# Patient Record
Sex: FEMALE | Race: ASIAN | NOT HISPANIC OR LATINO | Employment: FULL TIME | URBAN - METROPOLITAN AREA
[De-identification: names, ages, dates, MRNs, and addresses within clinical notes are randomized per-mention and may not be internally consistent; named-entity substitution may affect disease eponyms.]

---

## 2018-02-05 ENCOUNTER — OFFICE VISIT - HEALTHEAST (OUTPATIENT)
Dept: FAMILY MEDICINE | Facility: CLINIC | Age: 23
End: 2018-02-05

## 2018-02-05 ENCOUNTER — COMMUNICATION - HEALTHEAST (OUTPATIENT)
Dept: TELEHEALTH | Facility: CLINIC | Age: 23
End: 2018-02-05

## 2018-02-05 DIAGNOSIS — N63.10 BREAST MASS, RIGHT: ICD-10-CM

## 2018-02-05 ASSESSMENT — MIFFLIN-ST. JEOR: SCORE: 1249.26

## 2018-02-12 ENCOUNTER — HOSPITAL ENCOUNTER (OUTPATIENT)
Dept: MAMMOGRAPHY | Facility: HOSPITAL | Age: 23
Discharge: HOME | End: 2018-02-12
Attending: PHYSICIAN ASSISTANT

## 2018-02-12 DIAGNOSIS — N63.10 BREAST MASS, RIGHT: ICD-10-CM

## 2018-02-25 ENCOUNTER — HEALTH MAINTENANCE LETTER (OUTPATIENT)
Age: 23
End: 2018-02-25

## 2018-02-26 ENCOUNTER — OFFICE VISIT - HEALTHEAST (OUTPATIENT)
Dept: SURGERY | Facility: CLINIC | Age: 23
End: 2018-02-26

## 2018-02-26 DIAGNOSIS — N63.10 BREAST MASS, RIGHT: ICD-10-CM

## 2018-02-26 ASSESSMENT — MIFFLIN-ST. JEOR: SCORE: 1236

## 2018-03-16 ENCOUNTER — RECORDS - HEALTHEAST (OUTPATIENT)
Dept: LAB | Facility: CLINIC | Age: 23
End: 2018-03-16

## 2018-03-19 LAB
QTF INTERPRETATION: NORMAL
QTF MITOGEN - NIL: >10 IU/ML
QTF NIL: 0.05 IU/ML
QTF RESULT: NEGATIVE
QTF TB ANTIGEN - NIL: -0.02 IU/ML

## 2019-11-15 ENCOUNTER — COMMUNICATION - HEALTHEAST (OUTPATIENT)
Dept: TELEHEALTH | Facility: CLINIC | Age: 24
End: 2019-11-15

## 2019-11-15 ENCOUNTER — OFFICE VISIT - HEALTHEAST (OUTPATIENT)
Dept: INTERNAL MEDICINE | Facility: CLINIC | Age: 24
End: 2019-11-15

## 2019-11-15 DIAGNOSIS — Z71.1 CONCERN ABOUT STD IN FEMALE WITHOUT DIAGNOSIS: ICD-10-CM

## 2019-11-15 ASSESSMENT — MIFFLIN-ST. JEOR: SCORE: 1210.71

## 2020-02-24 ENCOUNTER — COMMUNICATION - HEALTHEAST (OUTPATIENT)
Dept: SCHEDULING | Facility: CLINIC | Age: 25
End: 2020-02-24

## 2020-09-11 ENCOUNTER — COMMUNICATION - HEALTHEAST (OUTPATIENT)
Dept: FAMILY MEDICINE | Facility: CLINIC | Age: 25
End: 2020-09-11

## 2020-09-23 ENCOUNTER — PRENATAL OFFICE VISIT - HEALTHEAST (OUTPATIENT)
Dept: FAMILY MEDICINE | Facility: CLINIC | Age: 25
End: 2020-09-23

## 2020-09-23 DIAGNOSIS — Z34.02 PRIMIGRAVIDA IN SECOND TRIMESTER: ICD-10-CM

## 2020-09-23 DIAGNOSIS — Z32.01 PREGNANCY TEST POSITIVE: ICD-10-CM

## 2020-09-23 DIAGNOSIS — N92.6 ABNORMAL MENSES: ICD-10-CM

## 2020-09-23 LAB
ALBUMIN UR-MCNC: NEGATIVE MG/DL
AMPHETAMINES UR QL SCN: NORMAL
BARBITURATES UR QL: NORMAL
BASOPHILS # BLD AUTO: 0 THOU/UL (ref 0–0.2)
BASOPHILS NFR BLD AUTO: 0 % (ref 0–2)
BENZODIAZ UR QL: NORMAL
CANNABINOIDS UR QL SCN: NORMAL
COCAINE UR QL: NORMAL
CREAT UR-MCNC: 149.6 MG/DL
EOSINOPHIL # BLD AUTO: 0.1 THOU/UL (ref 0–0.4)
EOSINOPHIL NFR BLD AUTO: 1 % (ref 0–6)
ERYTHROCYTE [DISTWIDTH] IN BLOOD BY AUTOMATED COUNT: 17.1 % (ref 11–14.5)
GLUCOSE UR STRIP-MCNC: NEGATIVE MG/DL
HCG UR QL: POSITIVE
HCT VFR BLD AUTO: 30.1 % (ref 35–47)
HGB BLD-MCNC: 9.3 G/DL (ref 12–16)
HIV 1+2 AB+HIV1 P24 AG SERPL QL IA: NEGATIVE
IMM GRANULOCYTES # BLD: 0.2 THOU/UL
IMM GRANULOCYTES NFR BLD: 1 %
KETONES UR STRIP-MCNC: NEGATIVE MG/DL
LYMPHOCYTES # BLD AUTO: 1.9 THOU/UL (ref 0.8–4.4)
LYMPHOCYTES NFR BLD AUTO: 15 % (ref 20–40)
MCH RBC QN AUTO: 20.4 PG (ref 27–34)
MCHC RBC AUTO-ENTMCNC: 30.9 G/DL (ref 32–36)
MCV RBC AUTO: 66 FL (ref 80–100)
MONOCYTES # BLD AUTO: 0.8 THOU/UL (ref 0–0.9)
MONOCYTES NFR BLD AUTO: 6 % (ref 2–10)
NEUTROPHILS # BLD AUTO: 9.7 THOU/UL (ref 2–7.7)
NEUTROPHILS NFR BLD AUTO: 77 % (ref 50–70)
OPIATES UR QL SCN: NORMAL
OXYCODONE UR QL: NORMAL
PCP UR QL SCN: NORMAL
PLATELET # BLD AUTO: 277 THOU/UL (ref 140–440)
PMV BLD AUTO: 11.3 FL (ref 8.5–12.5)
RBC # BLD AUTO: 4.56 MILL/UL (ref 3.8–5.4)
WBC: 12.6 THOU/UL (ref 4–11)

## 2020-09-23 ASSESSMENT — MIFFLIN-ST. JEOR: SCORE: 1241.89

## 2020-09-24 LAB
ABO/RH(D): NORMAL
ABORH REPEAT: NORMAL
ANTIBODY SCREEN: NEGATIVE
BACTERIA SPEC CULT: NORMAL
C TRACH DNA SPEC QL PROBE+SIG AMP: NEGATIVE
HBV SURFACE AG SERPL QL IA: NEGATIVE
N GONORRHOEA DNA SPEC QL NAA+PROBE: NEGATIVE
RUBV IGG SERPL QL IA: POSITIVE
T PALLIDUM AB SER QL: NEGATIVE

## 2020-09-26 LAB — LEAD BLDV-MCNC: <2 UG/DL

## 2020-09-27 LAB
COLLECTION METHOD: NORMAL
LEAD BLD-MCNC: NORMAL UG/DL

## 2020-10-02 ENCOUNTER — COMMUNICATION - HEALTHEAST (OUTPATIENT)
Dept: FAMILY MEDICINE | Facility: CLINIC | Age: 25
End: 2020-10-02

## 2020-10-02 ENCOUNTER — AMBULATORY - HEALTHEAST (OUTPATIENT)
Dept: FAMILY MEDICINE | Facility: CLINIC | Age: 25
End: 2020-10-02

## 2020-10-27 ENCOUNTER — PRENATAL OFFICE VISIT - HEALTHEAST (OUTPATIENT)
Dept: FAMILY MEDICINE | Facility: CLINIC | Age: 25
End: 2020-10-27

## 2020-10-27 DIAGNOSIS — Z12.4 CERVICAL CANCER SCREENING: ICD-10-CM

## 2020-10-27 DIAGNOSIS — Z34.00 SUPERVISION OF NORMAL FIRST PREGNANCY, ANTEPARTUM: ICD-10-CM

## 2020-10-27 DIAGNOSIS — D50.9 MICROCYTIC ANEMIA: ICD-10-CM

## 2020-10-27 DIAGNOSIS — B37.31 YEAST VAGINITIS: ICD-10-CM

## 2020-10-27 DIAGNOSIS — N63.0 LUMP OR MASS IN BREAST: ICD-10-CM

## 2020-10-27 DIAGNOSIS — A63.0 GENITAL WARTS: ICD-10-CM

## 2020-10-27 DIAGNOSIS — Z34.02 SUPERVISION OF NORMAL FIRST PREGNANCY IN SECOND TRIMESTER: ICD-10-CM

## 2020-10-27 LAB
ALBUMIN UR-MCNC: NEGATIVE MG/DL
ERYTHROCYTE [DISTWIDTH] IN BLOOD BY AUTOMATED COUNT: 15.6 % (ref 11–14.5)
GLUCOSE UR STRIP-MCNC: NEGATIVE MG/DL
HCT VFR BLD AUTO: 26.2 % (ref 35–47)
HGB BLD-MCNC: 8.2 G/DL (ref 12–16)
KETONES UR STRIP-MCNC: NEGATIVE MG/DL
MCH RBC QN AUTO: 20.8 PG (ref 27–34)
MCHC RBC AUTO-ENTMCNC: 31.3 G/DL (ref 32–36)
MCV RBC AUTO: 67 FL (ref 80–100)
PLATELET # BLD AUTO: 262 THOU/UL (ref 140–440)
PMV BLD AUTO: 8.5 FL (ref 7–10)
RBC # BLD AUTO: 3.94 MILL/UL (ref 3.8–5.4)
WBC: 13.9 THOU/UL (ref 4–11)

## 2020-10-27 ASSESSMENT — MIFFLIN-ST. JEOR: SCORE: 1266.84

## 2020-10-28 LAB
IRON SATN MFR SERPL: 38 % (ref 20–50)
IRON SERPL-MCNC: 96 UG/DL (ref 42–175)
TIBC SERPL-MCNC: 252 UG/DL (ref 313–563)
TRANSFERRIN SERPL-MCNC: 202 MG/DL (ref 212–360)

## 2020-10-30 LAB
# FETUSES US: NORMAL
AFP MOM SERPL: 1.01
AFP SERPL-MCNC: 69 NG/ML
AGE - REPORTED: 24.2 YR
BKR LAB AP ABNORMAL BLEEDING: NO
BKR LAB AP BIRTH CONTROL/HORMONES: NORMAL
BKR LAB AP CERVICAL APPEARANCE: NORMAL
BKR LAB AP GYN ADEQUACY: NORMAL
BKR LAB AP GYN INTERPRETATION: NORMAL
BKR LAB AP GYN OTHER FINDINGS: NORMAL
BKR LAB AP HPV REFLEX: NORMAL
BKR LAB AP LMP: NORMAL
BKR LAB AP PATIENT STATUS: NORMAL
BKR LAB AP PREVIOUS ABNORMAL: NORMAL
BKR LAB AP PREVIOUS NORMAL: NORMAL
CURRENT SMOKER: NO
FAMILY MEMBER DISEASES HX: NO
GA METHOD: NORMAL
GA: NORMAL WK
HCG MOM SERPL: 0.98
HCG SERPL-ACNC: NORMAL IU/L
HIGH RISK?: NO
HX OF HEREDITARY DISORDERS: NO
IDDM PATIENT QL: NO
INHIBIN A MOM SERPL: 1.16
INHIBIN A SERPL-MCNC: 218 PG/ML
INTEGRATED SCN PATIENT-IMP: NORMAL
PATH REPORT.COMMENTS IMP SPEC: NORMAL
PATHOLOGY STUDY: NORMAL
RESULT FLAG (HE HISTORICAL CONVERSION): NORMAL
SPECIMEN DRAWN SERPL: NORMAL
U ESTRIOL MOM SERPL: 0.88
U ESTRIOL SERPL-MCNC: 2.36 NG/ML

## 2020-11-02 ENCOUNTER — COMMUNICATION - HEALTHEAST (OUTPATIENT)
Dept: FAMILY MEDICINE | Facility: CLINIC | Age: 25
End: 2020-11-02

## 2020-11-02 LAB
HEMOGLOBIN A2 QUANTITATION: 6 % (ref 2.2–3.5)
HEMOGLOBIN ELECTROPHRESIS: ABNORMAL
HEMOGLOBIN F QUANTITATION: 2.2 % (ref 0–2)
PATH ICD:: ABNORMAL
REVIEWING PATHOLOGIST: ABNORMAL

## 2020-11-11 ENCOUNTER — COMMUNICATION - HEALTHEAST (OUTPATIENT)
Dept: FAMILY MEDICINE | Facility: CLINIC | Age: 25
End: 2020-11-11

## 2020-11-12 ENCOUNTER — HOSPITAL ENCOUNTER (OUTPATIENT)
Dept: ULTRASOUND IMAGING | Facility: CLINIC | Age: 25
Discharge: HOME | End: 2020-11-12
Attending: FAMILY MEDICINE

## 2020-11-12 DIAGNOSIS — Z34.02 SUPERVISION OF NORMAL FIRST PREGNANCY IN SECOND TRIMESTER: ICD-10-CM

## 2020-11-12 DIAGNOSIS — N63.0 LUMP OR MASS IN BREAST: ICD-10-CM

## 2020-11-19 ENCOUNTER — COMMUNICATION - HEALTHEAST (OUTPATIENT)
Dept: FAMILY MEDICINE | Facility: CLINIC | Age: 25
End: 2020-11-19

## 2020-12-01 ENCOUNTER — PRENATAL OFFICE VISIT - HEALTHEAST (OUTPATIENT)
Dept: FAMILY MEDICINE | Facility: CLINIC | Age: 25
End: 2020-12-01

## 2020-12-01 DIAGNOSIS — D56.3 ALPHA THALASSEMIA TRAIT: ICD-10-CM

## 2020-12-01 DIAGNOSIS — O99.810 ABNORMAL GLUCOSE TOLERANCE TEST IN PREGNANCY: ICD-10-CM

## 2020-12-01 DIAGNOSIS — O99.019 IRON DEFICIENCY ANEMIA DURING PREGNANCY: ICD-10-CM

## 2020-12-01 DIAGNOSIS — Z34.00 SUPERVISION OF NORMAL FIRST PREGNANCY, ANTEPARTUM: ICD-10-CM

## 2020-12-01 DIAGNOSIS — D50.9 IRON DEFICIENCY ANEMIA DURING PREGNANCY: ICD-10-CM

## 2020-12-02 ENCOUNTER — AMBULATORY - HEALTHEAST (OUTPATIENT)
Dept: LAB | Facility: CLINIC | Age: 25
End: 2020-12-02

## 2020-12-02 DIAGNOSIS — O99.019 IRON DEFICIENCY ANEMIA DURING PREGNANCY: ICD-10-CM

## 2020-12-02 DIAGNOSIS — Z34.00 SUPERVISION OF NORMAL FIRST PREGNANCY, ANTEPARTUM: ICD-10-CM

## 2020-12-02 DIAGNOSIS — D50.9 IRON DEFICIENCY ANEMIA DURING PREGNANCY: ICD-10-CM

## 2020-12-02 DIAGNOSIS — D56.3 ALPHA THALASSEMIA TRAIT: ICD-10-CM

## 2020-12-02 LAB
ERYTHROCYTE [DISTWIDTH] IN BLOOD BY AUTOMATED COUNT: 16.1 % (ref 11–14.5)
FASTING STATUS PATIENT QL REPORTED: NO
GLUCOSE 1H P 50 G GLC PO SERPL-MCNC: 140 MG/DL (ref 70–139)
HCT VFR BLD AUTO: 25.9 % (ref 35–47)
HGB BLD-MCNC: 8.1 G/DL (ref 12–16)
IRON SATN MFR SERPL: 38 % (ref 20–50)
IRON SERPL-MCNC: 102 UG/DL (ref 42–175)
MCH RBC QN AUTO: 21.2 PG (ref 27–34)
MCHC RBC AUTO-ENTMCNC: 31.4 G/DL (ref 32–36)
MCV RBC AUTO: 68 FL (ref 80–100)
PLATELET # BLD AUTO: 251 THOU/UL (ref 140–440)
PMV BLD AUTO: 8.2 FL (ref 7–10)
RBC # BLD AUTO: 3.83 MILL/UL (ref 3.8–5.4)
TIBC SERPL-MCNC: 272 UG/DL (ref 313–563)
TRANSFERRIN SERPL-MCNC: 218 MG/DL (ref 212–360)
WBC: 16.1 THOU/UL (ref 4–11)

## 2020-12-03 ENCOUNTER — COMMUNICATION - HEALTHEAST (OUTPATIENT)
Dept: FAMILY MEDICINE | Facility: CLINIC | Age: 25
End: 2020-12-03

## 2020-12-03 LAB — T PALLIDUM AB SER QL: NEGATIVE

## 2020-12-04 ENCOUNTER — COMMUNICATION - HEALTHEAST (OUTPATIENT)
Dept: FAMILY MEDICINE | Facility: CLINIC | Age: 25
End: 2020-12-04

## 2020-12-11 ENCOUNTER — AMBULATORY - HEALTHEAST (OUTPATIENT)
Dept: LAB | Facility: CLINIC | Age: 25
End: 2020-12-11

## 2020-12-11 DIAGNOSIS — O99.810 ABNORMAL GLUCOSE TOLERANCE TEST IN PREGNANCY: ICD-10-CM

## 2020-12-11 LAB
FASTING STATUS PATIENT QL REPORTED: YES
GLUCOSE 1H P 100 G GLC PO SERPL-MCNC: 148 MG/DL
GLUCOSE 2H P 100 G GLC PO SERPL-MCNC: 107 MG/DL
GLUCOSE 3H P 100 G GLC PO SERPL-MCNC: 102 MG/DL
GLUCOSE P FAST SERPL-MCNC: 86 MG/DL

## 2020-12-14 ENCOUNTER — COMMUNICATION - HEALTHEAST (OUTPATIENT)
Dept: ADMINISTRATIVE | Facility: HOSPITAL | Age: 25
End: 2020-12-14

## 2020-12-31 ENCOUNTER — OFFICE VISIT - HEALTHEAST (OUTPATIENT)
Dept: FAMILY MEDICINE | Facility: CLINIC | Age: 25
End: 2020-12-31

## 2020-12-31 DIAGNOSIS — D56.3 ALPHA THALASSEMIA TRAIT: ICD-10-CM

## 2021-01-08 ENCOUNTER — OFFICE VISIT - HEALTHEAST (OUTPATIENT)
Dept: ONCOLOGY | Facility: CLINIC | Age: 26
End: 2021-01-08

## 2021-01-08 DIAGNOSIS — D50.9 MICROCYTIC ANEMIA: ICD-10-CM

## 2021-01-08 DIAGNOSIS — Z34.00 SUPERVISION OF NORMAL FIRST PREGNANCY, ANTEPARTUM: ICD-10-CM

## 2021-01-08 LAB
BASO STIPL BLD QL SMEAR: ABNORMAL
BASOPHILS # BLD AUTO: 0 THOU/UL (ref 0–0.2)
BASOPHILS NFR BLD AUTO: 0 % (ref 0–2)
EOSINOPHIL COUNT (ABSOLUTE): 0 THOU/UL (ref 0–0.4)
EOSINOPHIL NFR BLD AUTO: 0 % (ref 0–6)
ERYTHROCYTE [DISTWIDTH] IN BLOOD BY AUTOMATED COUNT: 18.8 % (ref 11–14.5)
FERRITIN SERPL-MCNC: 97 NG/ML (ref 10–130)
HCT VFR BLD AUTO: 29.5 % (ref 35–47)
HGB BLD-MCNC: 9.1 G/DL (ref 12–16)
IMMATURE GRANULOCYTE % - MAN (DIFF): 2 %
IMMATURE GRANULOCYTE ABSOLUTE - MAN (DIFF): 0.4 THOU/UL
IRON SATN MFR SERPL: 39 % (ref 20–50)
IRON SERPL-MCNC: 124 UG/DL (ref 42–175)
LYMPHOCYTES # BLD AUTO: 3.7 THOU/UL (ref 0.8–4.4)
LYMPHOCYTES NFR BLD AUTO: 20 % (ref 20–40)
MANUAL NRBC PER 100 CELLS: 1
MCH RBC QN AUTO: 21.5 PG (ref 27–34)
MCHC RBC AUTO-ENTMCNC: 30.8 G/DL (ref 32–36)
MCV RBC AUTO: 70 FL (ref 80–100)
MONOCYTES # BLD AUTO: 0.4 THOU/UL (ref 0–0.9)
MONOCYTES NFR BLD AUTO: 2 % (ref 2–10)
MYELOCYTES (ABSOLUTE): 0.4 THOU/UL
MYELOCYTES NFR BLD MANUAL: 2 %
PATH REPORT.MICROSCOPIC SPEC OTHER STN: ABNORMAL
PLAT MORPH BLD: NORMAL
PLATELET # BLD AUTO: 240 THOU/UL (ref 140–440)
PMV BLD AUTO: 10 FL (ref 8.5–12.5)
RBC # BLD AUTO: 4.24 MILL/UL (ref 3.8–5.4)
RETICS # AUTO: 0.19 MILL/UL (ref 0.01–0.11)
RETICS/RBC NFR AUTO: 4.44 % (ref 0.8–2.7)
TEAR DROP: ABNORMAL
TIBC SERPL-MCNC: 315 UG/DL (ref 313–563)
TOTAL NEUTROPHILS-ABS(DIFF): 14.1 THOU/UL (ref 2–7.7)
TOTAL NEUTROPHILS-REL(DIFF): 76 % (ref 50–70)
TRANSFERRIN SERPL-MCNC: 252 MG/DL (ref 212–360)
WBC: 18.5 THOU/UL (ref 4–11)

## 2021-01-08 ASSESSMENT — MIFFLIN-ST. JEOR: SCORE: 1294.52

## 2021-01-11 LAB
LAB AP CHARGES (HE HISTORICAL CONVERSION): NORMAL
PATH REPORT.COMMENTS IMP SPEC: NORMAL
PATH REPORT.COMMENTS IMP SPEC: NORMAL
PATH REPORT.FINAL DX SPEC: NORMAL
PATH REPORT.MICROSCOPIC SPEC OTHER STN: NORMAL

## 2021-01-13 LAB
HEMOGLOBIN A2 QUANTITATION: 6.1 % (ref 2.2–3.5)
HEMOGLOBIN ELECTROPHRESIS: ABNORMAL
HEMOGLOBIN F QUANTITATION: 2 % (ref 0–2)
PATH ICD:: ABNORMAL
REVIEWING PATHOLOGIST: ABNORMAL

## 2021-01-15 ENCOUNTER — OFFICE VISIT - HEALTHEAST (OUTPATIENT)
Dept: ONCOLOGY | Facility: CLINIC | Age: 26
End: 2021-01-15

## 2021-01-15 ENCOUNTER — MEDICAL CORRESPONDENCE (OUTPATIENT)
Dept: HEALTH INFORMATION MANAGEMENT | Facility: CLINIC | Age: 26
End: 2021-01-15

## 2021-01-15 DIAGNOSIS — Z34.00 SUPERVISION OF NORMAL FIRST PREGNANCY, ANTEPARTUM: ICD-10-CM

## 2021-01-15 DIAGNOSIS — D50.9 MICROCYTIC ANEMIA: ICD-10-CM

## 2021-01-15 DIAGNOSIS — D56.3 BETA THALASSEMIA MINOR: ICD-10-CM

## 2021-01-19 ENCOUNTER — TELEPHONE (OUTPATIENT)
Dept: CONSULT | Facility: CLINIC | Age: 26
End: 2021-01-19

## 2021-01-19 ENCOUNTER — PRENATAL OFFICE VISIT - HEALTHEAST (OUTPATIENT)
Dept: FAMILY MEDICINE | Facility: CLINIC | Age: 26
End: 2021-01-19

## 2021-01-19 DIAGNOSIS — Z11.59 SCREENING FOR VIRAL DISEASE: ICD-10-CM

## 2021-01-19 DIAGNOSIS — O09.893 SUPERVISION OF OTHER HIGH RISK PREGNANCIES, THIRD TRIMESTER: ICD-10-CM

## 2021-01-19 DIAGNOSIS — Z23 IMMUNIZATION DUE: ICD-10-CM

## 2021-01-19 LAB
ALBUMIN UR-MCNC: NEGATIVE MG/DL
GLUCOSE UR STRIP-MCNC: NEGATIVE MG/DL
KETONES UR STRIP-MCNC: NEGATIVE MG/DL

## 2021-01-20 NOTE — TELEPHONE ENCOUNTER
Received referral for patient to be seen in Genetics clinic for Beta Thalassemia. Spoke with patient and assisted in scheduling GC only appointment.     Future Appointments   Date Time Provider Department Center   2/5/2021 10:00 AM [facility]

## 2021-01-29 ENCOUNTER — COMMUNICATION - HEALTHEAST (OUTPATIENT)
Dept: FAMILY MEDICINE | Facility: CLINIC | Age: 26
End: 2021-01-29

## 2021-02-01 ENCOUNTER — PRENATAL OFFICE VISIT - HEALTHEAST (OUTPATIENT)
Dept: FAMILY MEDICINE | Facility: CLINIC | Age: 26
End: 2021-02-01

## 2021-02-01 DIAGNOSIS — R10.11 RIGHT UPPER QUADRANT PAIN: ICD-10-CM

## 2021-02-01 DIAGNOSIS — O09.893 SUPERVISION OF OTHER HIGH RISK PREGNANCIES, THIRD TRIMESTER: ICD-10-CM

## 2021-02-01 DIAGNOSIS — N63.0 LUMP OR MASS IN BREAST: ICD-10-CM

## 2021-02-01 DIAGNOSIS — D50.8 IRON DEFICIENCY ANEMIA SECONDARY TO INADEQUATE DIETARY IRON INTAKE: ICD-10-CM

## 2021-02-01 DIAGNOSIS — Z11.59 SCREENING FOR VIRAL DISEASE: ICD-10-CM

## 2021-02-01 LAB
ALBUMIN SERPL-MCNC: 2.9 G/DL (ref 3.5–5)
ALBUMIN UR-MCNC: NEGATIVE MG/DL
ALP SERPL-CCNC: 189 U/L (ref 45–120)
ALT SERPL W P-5'-P-CCNC: 17 U/L (ref 0–45)
AST SERPL W P-5'-P-CCNC: 17 U/L (ref 0–40)
BILIRUB DIRECT SERPL-MCNC: 0.2 MG/DL
BILIRUB SERPL-MCNC: 0.6 MG/DL (ref 0–1)
GLUCOSE UR STRIP-MCNC: NEGATIVE MG/DL
KETONES UR STRIP-MCNC: NEGATIVE MG/DL
PROT SERPL-MCNC: 6.7 G/DL (ref 6–8)

## 2021-02-02 LAB — HCV AB SERPL QL IA: NEGATIVE

## 2021-02-04 ENCOUNTER — OFFICE VISIT - HEALTHEAST (OUTPATIENT)
Dept: FAMILY MEDICINE | Facility: CLINIC | Age: 26
End: 2021-02-04

## 2021-02-04 DIAGNOSIS — D56.3 BETA THALASSEMIA MINOR: ICD-10-CM

## 2021-02-04 DIAGNOSIS — D50.8 IRON DEFICIENCY ANEMIA SECONDARY TO INADEQUATE DIETARY IRON INTAKE: ICD-10-CM

## 2021-02-04 DIAGNOSIS — Z34.00 SUPERVISION OF NORMAL FIRST PREGNANCY, ANTEPARTUM: ICD-10-CM

## 2021-02-04 DIAGNOSIS — N63.0 LUMP OR MASS IN BREAST: ICD-10-CM

## 2021-02-04 DIAGNOSIS — O99.810 ABNORMAL GLUCOSE TOLERANCE TEST IN PREGNANCY: ICD-10-CM

## 2021-02-04 DIAGNOSIS — A63.0 GENITAL WARTS: ICD-10-CM

## 2021-02-05 ENCOUNTER — RECORDS - HEALTHEAST (OUTPATIENT)
Dept: ADMINISTRATIVE | Facility: OTHER | Age: 26
End: 2021-02-05

## 2021-02-05 ENCOUNTER — VIRTUAL VISIT (OUTPATIENT)
Dept: CONSULT | Facility: CLINIC | Age: 26
End: 2021-02-05
Attending: GENETIC COUNSELOR, MS
Payer: COMMERCIAL

## 2021-02-05 DIAGNOSIS — D58.2 HIGH BLOOD HEMOGLOBIN A2 (H): ICD-10-CM

## 2021-02-05 DIAGNOSIS — Z71.83 ENCOUNTER FOR NONPROCREATIVE GENETIC COUNSELING: ICD-10-CM

## 2021-02-05 DIAGNOSIS — D56.3 CARRIER OF BETA THALASSEMIA: ICD-10-CM

## 2021-02-05 DIAGNOSIS — Z31.5 ENCOUNTER FOR PROCREATIVE GENETIC COUNSELING: ICD-10-CM

## 2021-02-05 DIAGNOSIS — D50.9 MICROCYTIC ANEMIA: ICD-10-CM

## 2021-02-05 DIAGNOSIS — D56.3 BETA THALASSEMIA TRAIT: Primary | ICD-10-CM

## 2021-02-05 PROCEDURE — 96040 HC GENETIC COUNSELING, EACH 30 MINUTES: CPT | Mod: TEL | Performed by: GENETIC COUNSELOR, MS

## 2021-02-05 NOTE — LETTER
2/5/2021      RE: Caroline Rowe  [address] [facility] [address]       Caroline is a 25 year old who is being evaluated via a billable telephone visit.      What phone number would you like to be contacted at? [phone]  How would you like to obtain your AVS?     Bibiana Chapman M.A.    Presenting information:   Caroline Rowe is a 25 year old female with a suspected diagnosis of beta thalassemia trait. She was referred for a genetics evaluation by Dr. Vielka Uribe (hematology), and was seen today at the [facility]. I met with Caroline conner by telephone to obtain a personal and family history, discuss the genetics of beta thalassemia, and discuss the option of genetic testing.    Personal History:   During prenatal blood work, Caroline was found to have microcytic anemia. Specifically, CBC was abnormal and Hgb A2 Quant elevated at 6.1%   (Hgb F Quant 2.0%). This was suspicious for beta thalassemia trait. She notes she has taken a vitamin and iron tablet since. Based on this history, she was referred to genetic counseling for suspicion of beta thalassemia trait. See Dr. Uribe's note for additional details and labs.    Caroline notes no major fatigue, abdominal pain, jaundice, and weakness. She did share she has been rather tired and taking naps during pregnancy. She notes no other health concerns/specialists.    Family History:   A three generation pedigree was obtained today and sent to be scanned into the EMR. The family history was significant for the following:    Caroline is currently pregnant with a baby boy with a due date of March 13th. This is her first pregnancy. Her partner is 27 years old and healthy. He has not yet gotten his hemoglobin tested. She is not aware of any major health concerns in his family. He is of [facility] ancestry.    Caroline has one sister (23 yrs) and one brother (15 yrs), who are healthy.     Caroline's mother is 43 years old and healthy. Caroline reports her maternal  aunts/uncles, cousins, and grandparents have no major health concerns.    Caroline's father is 45 years old and healthy. One of his brothers passed away at 27 years after drowning while swimming. Caroline is not aware of any health concerns for her other 3 uncles and 2 aunts, who live in another state. Paternal cousins and grandparents have no major health concerns.    The family history was otherwise negative for individuals with ID/DD, abnormal NBS, birth defects, miscarriages or infertility, and anemia/beta thalassemia.     Caroline is of /[place] ancestry on her maternal and paternal side. Consanguinity was denied.    Background:   Genes are long stretches of DNA that are responsible for how our bodies look and how our bodies work. We all have two copies of every gene, one inherited from our mother and one inherited from our father. When there is a change, called a mutation, in a gene it can cause it to not do its job correctly which can cause the signs and symptoms of a genetic condition.     Caroline was not familial with beta thalassemia, so we first reviewed general information about this condition.    Beta thalassemia is an inherited blood disorder that affects the beta subunit of hemoglobin. Hemoglobin is the iron-containing protein in red blood cells that carries oxygen to cells throughout the body. Beta-globin is a component (subunit) of hemoglobin. In people with beta thalassemia, low levels of hemoglobin lead to a lack of oxygen in many parts of the body. Affected individuals also have a shortage of red blood cells (anemia), which can cause pale skin, weakness, fatigue, and more serious complications. People with beta thalassemia are at an increased risk of developing abnormal blood clots.    Beta thalassemia is caused by mutations in a gene called HBB. The HBB gene provides instructions for making a protein called beta-globin. Mutations in this gene prevent the production or reduce the amount of beta-globin.  "This causes the signs and symptoms of beta thalassemia.    There are three types of beta thalassemia:  ? Beta thalassemia minor: Also called \"trait\" or \"carrier\". Individuals with beta thal trait may have mild anemia but are typically asymptomatic and healthy. They have one HBB mutation (one copy of the gene that is not working, and one copy that is working as normal).    Individuals with beta thalassemia minor may appear to have iron deficiency anemia for which iron therapy is technically not effective and iron overload is possible if medicinal levels of iron are prescribed.   ? Beta thalassemia intermedia: Individuals with intermedia may have mild anemia at a later age as well as slow growth and hepatosplenomegaly due to iron overload.  Individuals with intermedia would be expected to have a HBB mutation on both copies of the gene.  ? Beta thalassemia major: Individuals with major have the most severe type. Also called Plascencia's anemia. A child with beta thalassemia major will show symptoms in the first year of life and often be pale, tired, and irritable. A child's spleen, liver, and heart may also be enlarged and their overall growth can slow due to brittle or thin bones. Individuals with major typically need routine blood transfusions as treatment with other lifelong therapies to eliminate excess iron in the body. Individuals would usually be diagnosed in the first year or two of their life. Individuals with major would be expected to have a HBB mutation on both copies of the gene.     Beta thalassemia is often described as being inherited in an autosomal recessive pattern. This means that to be affected with beta thalassemia intermedia or major, an individual must inherit a mutation in both copies of the HBB gene (one from each parent).  Individuals with just one mutation in the HBB gene are said to be carriers or have beta thalassemia trait. Carriers/trait do not have severe disease personally, but can have an " affected child if their partner is also a carrier. When both parents are carriers, with each pregnancy there is a 25% chance for the child to be affected with beta thalassemia intermedia or major, a 50% chance for the child to have trait only, and a 25% chance for the child to be an unaffected non-carrier. No one has control over which copy of a gene they pass on to their child since it is a random process.     Past Results and Genetic Testing:  Lab values like mean corpuscular volume (MCV) can suggest a hemoglobinopathy is present. To screen for hemoglobinopathies, a hemoglobin electrophoresis can be used to screen for beta thalassemia, and other hemoglobin variants like sickle cell disease.     Caroline's previous blood work was most consistent with her having beta thalassemia trait. We reviewed that most individuals with trait are asymptomatic or have mild anemia. She would NOT be expected to personally have the symptoms of beta thalassemia intermedia or major.    Therefore, we discussed that based on past blood work, Caroline likely has one HBB mutation. We discussed that genetic testing is available to confirm she has one mutation, and therefore beta thalassemia trait. Genetic testing involves analyzing the relevant gene for pathogenic genetic changes. We discussed benefits and limitations of this testing. For one, genetic testing would confirm Caroline has beta thalassemia trait. This would confirm correct management for her. Second, this would help confirm chance for her child and any future children to have a type of beta thalassemia (trait or more severe). Lastly, once specific mutations are known in a family, targeted genetic testing for other family members (such as for Caroline's children and siblings) would be available to determine if they carry the same mutation(s) or not. Different reproductive options are also available when both parents are known carriers and exact mutations are known. Genetic testing involves  a blood draw or saliva sample. We normally have the lab complete a benefits investigation for more information about coverage of genetic testing.    After discussion, Caroline declined genetic testing today. She was aware this remains an option. It was difficult over the phone to assess understanding given how much new information we presented today, so I did offer a repeat genetic counseling appointment or checking in, which Caroline also declined. I will send a copy of my note to her to review, and she is welcome to follow up with me with questions or if she does wish to pursue genetic testing at any time.     Reproductive Information:  Having beta thalassemia trait can have important reproductive implications. Specifically, assuming Caroline has beta thalassemia trait, her chance to have a child with a more severe type of beta thalassemia would depend on if her partner also had trait/HBB mutations or not. If he also has trait (or is a carrier for a different type of related hemoglobinopathy like sickle cell disease) there is a 25% chance for each of their children to have a more severe type of beta thalassemia. If her partner is not a carrier, chance would be low for a child to have a more severe type of thalassemia, though they could have trait like Caroline.     Therefore, testing would be recommended for Caroline's partner for more information about if he could have trait and to better estimate the reproductive risks for the couple. We discussed this testing could be genetic testing through myself and/or a hemoglobin electrophoresis to screen him for beta chain related hemoglobinopathies which likely could be ordered by his primary care provider. Caroline planned to share this recommendation with her partner. He is also welcome to call me directly for help getting testing. Of note, the most comprehensive screening method would be molecular testing to also assess the alpha chain status and sickle cell as well.     If carrier  testing for her partner is not done, testing for beta thalassemia for Caroline's baby would be recommended within the first year. His pediatrician would very likely be able to help arrange this testing after he was born. Testing for Caroline's partner would still be helpful in clarifying their chance to have a child with a more severe type of beta thalassemia, especially if additional pregnancies for the couple are planned.    Family Member Implications:  We reviewed that other family members could also be a carrier for beta thalassemia and that it was important for this information be shared with Caroline's extended family.  For example, we would assume one of Caroline's parents likely have beta thalassemia trait, and Caroline's siblings would have a 50% chance to have trait. If another family member is found to be a carrier/trait for beta thalassemia, their partner could be tested to determine if he or she is also a carrier. If both members of the couple are carriers, then they could have a child with a more severe type of beta thalassemia. Family members are welcome to call me directly or ask their PCP to help with testing.       It was a pleasure to meet with Caroline virtually today. She had no additional questions at this time. Contact information was shared for any future questions or concerns that arise.    Plan:   1. We discussed the genetics and inheritance of beta thalassemia today.  2. Caroline declined genetic testing as well as an additional genetics counseling appointment for beta thalassemia. She was aware this remains an option at any time.   3. Testing would be recommended for Caroline's partner for more information about their chance to have a child with a more severe type of beta thalassemia.  4. If her partner does not have testing before their baby is born, it would be recommended that Caroline speak to baby's pediatrician about screening him for beta thalassemia.  5. Follow up with care team pending the above.  6.  Contact information was provided should any questions arise in the future.     Stella Bridges MS, [facility]  Genetic Counselor  Division of Genetics and Metabolism  [facility]   Phone: [phone]  Pager: [phone]      Approximate Time Spent on Phone: 45 minutes    CC: Send copy to SARANYA Velazquez, and Dr. Vielka Uribe

## 2021-02-17 ENCOUNTER — COMMUNICATION - HEALTHEAST (OUTPATIENT)
Dept: FAMILY MEDICINE | Facility: CLINIC | Age: 26
End: 2021-02-17

## 2021-02-17 ENCOUNTER — COMMUNICATION - HEALTHEAST (OUTPATIENT)
Dept: SCHEDULING | Facility: CLINIC | Age: 26
End: 2021-02-17

## 2021-02-19 ENCOUNTER — PRENATAL OFFICE VISIT - HEALTHEAST (OUTPATIENT)
Dept: FAMILY MEDICINE | Facility: CLINIC | Age: 26
End: 2021-02-19

## 2021-02-19 DIAGNOSIS — O09.893 SUPERVISION OF OTHER HIGH RISK PREGNANCIES, THIRD TRIMESTER: ICD-10-CM

## 2021-02-19 LAB
ALBUMIN UR-MCNC: ABNORMAL MG/DL
GLUCOSE UR STRIP-MCNC: NEGATIVE MG/DL
KETONES UR STRIP-MCNC: NEGATIVE MG/DL

## 2021-02-20 LAB
ALLERGIC TO PENICILLIN: NO
GP B STREP DNA SPEC QL NAA+PROBE: NEGATIVE

## 2021-02-26 ENCOUNTER — PRENATAL OFFICE VISIT - HEALTHEAST (OUTPATIENT)
Dept: FAMILY MEDICINE | Facility: CLINIC | Age: 26
End: 2021-02-26

## 2021-02-26 ENCOUNTER — COMMUNICATION - HEALTHEAST (OUTPATIENT)
Dept: FAMILY MEDICINE | Facility: CLINIC | Age: 26
End: 2021-02-26

## 2021-02-26 DIAGNOSIS — O09.893 SUPERVISION OF OTHER HIGH RISK PREGNANCIES, THIRD TRIMESTER: ICD-10-CM

## 2021-02-26 DIAGNOSIS — Z34.00 SUPERVISION OF NORMAL FIRST PREGNANCY, ANTEPARTUM: ICD-10-CM

## 2021-03-02 ENCOUNTER — PRENATAL OFFICE VISIT - HEALTHEAST (OUTPATIENT)
Dept: FAMILY MEDICINE | Facility: CLINIC | Age: 26
End: 2021-03-02

## 2021-03-02 DIAGNOSIS — Z34.03 SUPERVISION OF NORMAL FIRST PREGNANCY IN THIRD TRIMESTER: ICD-10-CM

## 2021-03-06 ENCOUNTER — HOSPITAL ENCOUNTER (OUTPATIENT)
Dept: MEDSURG UNIT | Facility: CLINIC | Age: 26
Discharge: HOME | End: 2021-03-06
Attending: FAMILY MEDICINE | Admitting: FAMILY MEDICINE

## 2021-03-06 LAB — RUPTURE OF FETAL MEMBRANES BY ROM PLUS: POSITIVE

## 2021-03-07 ENCOUNTER — COMMUNICATION - HEALTHEAST (OUTPATIENT)
Dept: SCHEDULING | Facility: CLINIC | Age: 26
End: 2021-03-07

## 2021-03-10 ENCOUNTER — RECORDS - HEALTHEAST (OUTPATIENT)
Dept: ADMINISTRATIVE | Facility: OTHER | Age: 26
End: 2021-03-10

## 2021-03-12 ENCOUNTER — COMMUNICATION - HEALTHEAST (OUTPATIENT)
Dept: FAMILY MEDICINE | Facility: CLINIC | Age: 26
End: 2021-03-12

## 2021-03-12 DIAGNOSIS — K64.4 EXTERNAL HEMORRHOIDS: ICD-10-CM

## 2021-03-15 ENCOUNTER — COMMUNICATION - HEALTHEAST (OUTPATIENT)
Dept: FAMILY MEDICINE | Facility: CLINIC | Age: 26
End: 2021-03-15

## 2021-03-29 ENCOUNTER — COMMUNICATION - HEALTHEAST (OUTPATIENT)
Dept: FAMILY MEDICINE | Facility: CLINIC | Age: 26
End: 2021-03-29

## 2021-04-08 ENCOUNTER — OFFICE VISIT - HEALTHEAST (OUTPATIENT)
Dept: FAMILY MEDICINE | Facility: CLINIC | Age: 26
End: 2021-04-08

## 2021-04-08 DIAGNOSIS — M53.3 SACROILIAC JOINT PAIN: ICD-10-CM

## 2021-04-09 ENCOUNTER — COMMUNICATION - HEALTHEAST (OUTPATIENT)
Dept: FAMILY MEDICINE | Facility: CLINIC | Age: 26
End: 2021-04-09

## 2021-04-09 DIAGNOSIS — N63.0 LUMP OR MASS IN BREAST: ICD-10-CM

## 2021-04-12 ENCOUNTER — OFFICE VISIT - HEALTHEAST (OUTPATIENT)
Dept: FAMILY MEDICINE | Facility: CLINIC | Age: 26
End: 2021-04-12

## 2021-04-12 ASSESSMENT — EDINBURGH POSTNATAL DEPRESSION SCALE (EPDS): TOTAL SCORE: 0

## 2021-04-13 NOTE — PROGRESS NOTES
Presenting information:   Caroline Rowe is a 25 year old female with a suspected diagnosis of beta thalassemia trait. She was referred for a genetics evaluation by Dr. Vielka Uribe (hematology), and was seen today at the [facility]. I met with Caroline conner by telephone to obtain a personal and family history, discuss the genetics of beta thalassemia, and discuss the option of genetic testing.    Personal History:   During prenatal blood work, Caroline was found to have microcytic anemia. Specifically, CBC was abnormal and Hgb A2 Quant elevated at 6.1%   (Hgb F Quant 2.0%). This was suspicious for beta thalassemia trait. She notes she has taken a vitamin and iron tablet since. Based on this history, she was referred to genetic counseling for suspicion of beta thalassemia trait. See Dr. Uribe's note for additional details and labs.    Caroline notes no major fatigue, abdominal pain, jaundice, and weakness. She did share she has been rather tired and taking naps during pregnancy. She notes no other health concerns/specialists.    Family History:   A three generation pedigree was obtained today and sent to be scanned into the EMR. The family history was significant for the following:    Caroline is currently pregnant with a baby boy with a due date of March 13th. This is her first pregnancy. Her partner is 27 years old and healthy. He has not yet gotten his hemoglobin tested. She is not aware of any major health concerns in his family. He is of [facility] [place].    Caroline has one sister (23 yrs) and one brother (15 yrs), who are healthy.     Caroline's mother is 43 years old and healthy. Caroline reports her maternal aunts/uncles, cousins, and grandparents have no major health concerns.    Caroline's father is 45 years old and healthy. One of his brothers passed away at 27 years after drowning while swimming. Caroline is not aware of any health concerns for her other 3 uncles and 2 aunts, who live in another state.  "Paternal cousins and grandparents have no major health concerns.    The family history was otherwise negative for individuals with ID/DD, abnormal NBS, birth defects, miscarriages or infertility, and anemia/beta thalassemia.     Caroline is of /[place] ancestry on her maternal and paternal side. Consanguinity was denied.    Background:   Genes are long stretches of DNA that are responsible for how our bodies look and how our bodies work. We all have two copies of every gene, one inherited from our mother and one inherited from our father. When there is a change, called a mutation, in a gene it can cause it to not do its job correctly which can cause the signs and symptoms of a genetic condition.     Caroline was not familial with beta thalassemia, so we first reviewed general information about this condition.    Beta thalassemia is an inherited blood disorder that affects the beta subunit of hemoglobin. Hemoglobin is the iron-containing protein in red blood cells that carries oxygen to cells throughout the body. Beta-globin is a component (subunit) of hemoglobin. In people with beta thalassemia, low levels of hemoglobin lead to a lack of oxygen in many parts of the body. Affected individuals also have a shortage of red blood cells (anemia), which can cause pale skin, weakness, fatigue, and more serious complications. People with beta thalassemia are at an increased risk of developing abnormal blood clots.    Beta thalassemia is caused by mutations in a gene called HBB. The HBB gene provides instructions for making a protein called beta-globin. Mutations in this gene prevent the production or reduce the amount of beta-globin. This causes the signs and symptoms of beta thalassemia.    There are three types of beta thalassemia:  ? Beta thalassemia minor: Also called \"trait\" or \"carrier\". Individuals with beta thal trait may have mild anemia but are typically asymptomatic and healthy. They have one HBB mutation (one copy of " the gene that is not working, and one copy that is working as normal).    Individuals with beta thalassemia minor may appear to have iron deficiency anemia for which iron therapy is technically not effective and iron overload is possible if medicinal levels of iron are prescribed.   ? Beta thalassemia intermedia: Individuals with intermedia may have mild anemia at a later age as well as slow growth and hepatosplenomegaly due to iron overload.  Individuals with intermedia would be expected to have a HBB mutation on both copies of the gene.  ? Beta thalassemia major: Individuals with major have the most severe type. Also called Plascencia's anemia. A child with beta thalassemia major will show symptoms in the first year of life and often be pale, tired, and irritable. A child's spleen, liver, and heart may also be enlarged and their overall growth can slow due to brittle or thin bones. Individuals with major typically need routine blood transfusions as treatment with other lifelong therapies to eliminate excess iron in the body. Individuals would usually be diagnosed in the first year or two of their life. Individuals with major would be expected to have a HBB mutation on both copies of the gene.     Beta thalassemia is often described as being inherited in an autosomal recessive pattern. This means that to be affected with beta thalassemia intermedia or major, an individual must inherit a mutation in both copies of the HBB gene (one from each parent).  Individuals with just one mutation in the HBB gene are said to be carriers or have beta thalassemia trait. Carriers/trait do not have severe disease personally, but can have an affected child if their partner is also a carrier. When both parents are carriers, with each pregnancy there is a 25% chance for the child to be affected with beta thalassemia intermedia or major, a 50% chance for the child to have trait only, and a 25% chance for the child to be an unaffected  non-carrier. No one has control over which copy of a gene they pass on to their child since it is a random process.     Past Results and Genetic Testing:  Lab values like mean corpuscular volume (MCV) can suggest a hemoglobinopathy is present. To screen for hemoglobinopathies, a hemoglobin electrophoresis can be used to screen for beta thalassemia, and other hemoglobin variants like sickle cell disease.     Caroline's previous blood work was most consistent with her having beta thalassemia trait. We reviewed that most individuals with trait are asymptomatic or have mild anemia. She would NOT be expected to personally have the symptoms of beta thalassemia intermedia or major.    Therefore, we discussed that based on past blood work, Caroline likely has one HBB mutation. We discussed that genetic testing is available to confirm she has one mutation, and therefore beta thalassemia trait. Genetic testing involves analyzing the relevant gene for pathogenic genetic changes. We discussed benefits and limitations of this testing. For one, genetic testing would confirm Caroline has beta thalassemia trait. This would confirm correct management for her. Second, this would help confirm chance for her child and any future children to have a type of beta thalassemia (trait or more severe). Lastly, once specific mutations are known in a family, targeted genetic testing for other family members (such as for Caroline's children and siblings) would be available to determine if they carry the same mutation(s) or not. Different reproductive options are also available when both parents are known carriers and exact mutations are known. Genetic testing involves a blood draw or saliva sample. We normally have the lab complete a benefits investigation for more information about coverage of genetic testing.    After discussion, Caroline declined genetic testing today. She was aware this remains an option. It was difficult over the phone to assess  understanding given how much new information we presented today, so I did offer a repeat genetic counseling appointment or checking in, which Caroline also declined. I will send a copy of my note to her to review, and she is welcome to follow up with me with questions or if she does wish to pursue genetic testing at any time.     Reproductive Information:  Having beta thalassemia trait can have important reproductive implications. Specifically, assuming Caroline has beta thalassemia trait, her chance to have a child with a more severe type of beta thalassemia would depend on if her partner also had trait/HBB mutations or not. If he also has trait (or is a carrier for a different type of related hemoglobinopathy like sickle cell disease) there is a 25% chance for each of their children to have a more severe type of beta thalassemia. If her partner is not a carrier, chance would be low for a child to have a more severe type of thalassemia, though they could have trait like Caroline.     Therefore, testing would be recommended for Caroline's partner for more information about if he could have trait and to better estimate the reproductive risks for the couple. We discussed this testing could be genetic testing through myself and/or a hemoglobin electrophoresis to screen him for beta chain related hemoglobinopathies which likely could be ordered by his primary care provider. Caroline planned to share this recommendation with her partner. He is also welcome to call me directly for help getting testing. Of note, the most comprehensive screening method would be molecular testing to also assess the alpha chain status and sickle cell as well.     If carrier testing for her partner is not done, testing for beta thalassemia for Caroline's baby would be recommended within the first year. His pediatrician would very likely be able to help arrange this testing after he was born. Testing for Caroline's partner would still be helpful in clarifying their  chance to have a child with a more severe type of beta thalassemia, especially if additional pregnancies for the couple are planned.    Family Member Implications:  We reviewed that other family members could also be a carrier for beta thalassemia and that it was important for this information be shared with Carloine's extended family.  For example, we would assume one of Caroline's parents likely have beta thalassemia trait, and Caroline's siblings would have a 50% chance to have trait. If another family member is found to be a carrier/trait for beta thalassemia, their partner could be tested to determine if he or she is also a carrier. If both members of the couple are carriers, then they could have a child with a more severe type of beta thalassemia. Family members are welcome to call me directly or ask their PCP to help with testing.       It was a pleasure to meet with Caroline virtually today. She had no additional questions at this time. Contact information was shared for any future questions or concerns that arise.    Plan:   1. We discussed the genetics and inheritance of beta thalassemia today.  2. Caroline declined genetic testing as well as an additional genetics counseling appointment for beta thalassemia. She was aware this remains an option at any time.   3. Testing would be recommended for Caroline's partner for more information about their chance to have a child with a more severe type of beta thalassemia.  4. If her partner does not have testing before their baby is born, it would be recommended that Caroline speak to baby's pediatrician about screening him for beta thalassemia.  5. Follow up with care team pending the above.  6. Contact information was provided should any questions arise in the future.     Stella Bridges MS, [facility]  Genetic Counselor  Division of Genetics and Metabolism  [facility]   Phone: [phone]  Pager: [phone]      Approximate Time Spent on Phone: 40  minutes    CC: Send copy to SARANYA Velazquez, and Dr. Vielka Uribe

## 2021-04-15 ENCOUNTER — AMBULATORY - HEALTHEAST (OUTPATIENT)
Dept: FAMILY MEDICINE | Facility: CLINIC | Age: 26
End: 2021-04-15

## 2021-04-15 DIAGNOSIS — Z11.59 ENCOUNTER FOR SCREENING FOR OTHER VIRAL DISEASES: ICD-10-CM

## 2021-04-22 ENCOUNTER — COMMUNICATION - HEALTHEAST (OUTPATIENT)
Dept: ADMINISTRATIVE | Facility: HOSPITAL | Age: 26
End: 2021-04-22

## 2021-04-27 ENCOUNTER — AMBULATORY - HEALTHEAST (OUTPATIENT)
Dept: LAB | Facility: CLINIC | Age: 26
End: 2021-04-27

## 2021-04-27 DIAGNOSIS — Z11.59 ENCOUNTER FOR SCREENING FOR OTHER VIRAL DISEASES: ICD-10-CM

## 2021-04-28 LAB
SARS-COV-2 PCR COMMENT: NORMAL
SARS-COV-2 RNA SPEC QL NAA+PROBE: NEGATIVE
SARS-COV-2 VIRUS SPECIMEN SOURCE: NORMAL

## 2021-04-29 ENCOUNTER — COMMUNICATION - HEALTHEAST (OUTPATIENT)
Dept: SCHEDULING | Facility: CLINIC | Age: 26
End: 2021-04-29

## 2021-04-30 ENCOUNTER — RECORDS - HEALTHEAST (OUTPATIENT)
Dept: FAMILY MEDICINE | Facility: CLINIC | Age: 26
End: 2021-04-30

## 2021-04-30 ENCOUNTER — COMMUNICATION - HEALTHEAST (OUTPATIENT)
Dept: FAMILY MEDICINE | Facility: CLINIC | Age: 26
End: 2021-04-30

## 2021-04-30 ENCOUNTER — HOSPITAL ENCOUNTER (OUTPATIENT)
Dept: ULTRASOUND IMAGING | Facility: CLINIC | Age: 26
Discharge: HOME | End: 2021-04-30
Attending: FAMILY MEDICINE
Payer: COMMERCIAL

## 2021-04-30 DIAGNOSIS — N63.10 BREAST MASS, RIGHT: ICD-10-CM

## 2021-04-30 DIAGNOSIS — N61.0 MASTITIS: ICD-10-CM

## 2021-04-30 DIAGNOSIS — N63.0 LUMP OR MASS IN BREAST: ICD-10-CM

## 2021-05-04 ENCOUNTER — OFFICE VISIT - HEALTHEAST (OUTPATIENT)
Dept: FAMILY MEDICINE | Facility: CLINIC | Age: 26
End: 2021-05-04

## 2021-05-04 DIAGNOSIS — N64.4 BREAST PAIN: ICD-10-CM

## 2021-05-04 DIAGNOSIS — Z91.89 BREASTFEEDING PROBLEM: ICD-10-CM

## 2021-05-04 ASSESSMENT — MIFFLIN-ST. JEOR: SCORE: 1258.16

## 2021-05-06 ENCOUNTER — COMMUNICATION - HEALTHEAST (OUTPATIENT)
Dept: FAMILY MEDICINE | Facility: CLINIC | Age: 26
End: 2021-05-06

## 2021-05-26 ENCOUNTER — AMBULATORY - HEALTHEAST (OUTPATIENT)
Dept: NURSING | Facility: CLINIC | Age: 26
End: 2021-05-26

## 2021-05-27 VITALS
SYSTOLIC BLOOD PRESSURE: 101 MMHG | WEIGHT: 125.75 LBS | DIASTOLIC BLOOD PRESSURE: 61 MMHG | HEIGHT: 61 IN | TEMPERATURE: 97.3 F | BODY MASS INDEX: 23.74 KG/M2 | HEART RATE: 76 BPM

## 2021-05-31 VITALS — BODY MASS INDEX: 23.22 KG/M2 | HEIGHT: 61 IN | WEIGHT: 123 LBS

## 2021-05-31 VITALS — WEIGHT: 122.7 LBS | HEIGHT: 61 IN | BODY MASS INDEX: 23.17 KG/M2

## 2021-06-03 VITALS
SYSTOLIC BLOOD PRESSURE: 114 MMHG | DIASTOLIC BLOOD PRESSURE: 66 MMHG | OXYGEN SATURATION: 100 % | WEIGHT: 118 LBS | HEIGHT: 60 IN | HEART RATE: 83 BPM | BODY MASS INDEX: 23.16 KG/M2

## 2021-06-03 NOTE — PROGRESS NOTES
[facility] Clinic Note  Caroline Rowe   23 y.o. female    Date of Visit: 11/15/2019  Chief Complaint   Patient presents with     STD Check       Assessment/Plan  1. Concern about STD in female without diagnosis  Counseled patient at length regarding what exactly HSV is, the 2 types of HSV, how is transmitted and most importantly how it is diagnosed.  We also spoke at length regarding the psychosocial/psychological stigma and concerns related to a diagnosis.  We spoke about safety of herself as well as of her boyfriend in light of his recent diagnosis.  Decision made to wait for formal specification of if he has HSV-1 versus HSV-2, complete extensive patient education reading I provided to her and to reach out to us if she would like to undergo blood testing and/or if she develops any new symptoms.     Much or all of the text in this note was generated through the use of Dragon Dictate voice-to-text software. Errors in spelling or words which seem out of context are unintentional. Sound alike errors, in particular, may have escaped editing  Shlomo Dunham MD    Return if symptoms worsen or fail to improve.    Subjective  This 23 y.o. old female who presents with concern for herpes simplex.  States that her Boyfriend just tested for HSV, and is waiting till Tuesday for specification for type I or type II. Been together for 6 months. Has a lesion on his penis.  States he was treated for chlamydia in the past.  Denies any mucosal lesions, cutaneous lesions or vaginal discharge.  Concerned about sharing a drink with her boyfriend. Denies f/s/c, sore throat, BV/DV, chest pain or palpitations, no eye pain. No oral sex in the past and endorses periodic use of protection (condom). Denies any intent to hurt anyone. Refused a flu shot today.  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    ROS A comprehensive review of systems was performed and was otherwise negative    Medications, allergies,  and problem list were reviewed and updated    Exam  General appearance: Pleasant, nontoxic-appearing, no acute distress, alert and oriented x4  Vitals:    11/15/19 1559   BP: 114/66   Pulse: 83   SpO2: 100%     HEAD, EARS, NOSE, MOUTH, AND THROAT: Head and face were normal.  Perioral region and oropharynx was normal without any herpetic ulcers or lesions.  RESPIRATORY: Bilaterally with no crackles, wheezing or rhonchi  CARDIOVASCULAR: Regular S1 and S2.  Radial pulses intact.  No edema.  PSYCHIATRIC:  Mood and affect were normal and the patient had normal recent and remote memory. The patient's judgment and insight were normal.    Additional Information   No current outpatient medications on file.     No current facility-administered medications for this visit.      No Known Allergies  Social History     Social History Narrative     Not on file     Social History     Tobacco Use     Smoking status: Never Smoker     Smokeless tobacco: Never Used   Substance Use Topics     Alcohol use: Yes     Alcohol/week: 3.0 standard drinks     Types: 3 Standard drinks or equivalent per week     Drug use: No     History reviewed. No pertinent family history.

## 2021-06-03 NOTE — PATIENT INSTRUCTIONS - HE
Monitor your symptoms over the weekend. Please abstain from sexual contact.  Await the  results from your boyfriend. Call if you decide you want to undergo the blood test AND/OR you develop new symptoms/blisters. At that point, I will order for the test and you can just come to clinic.

## 2021-06-04 VITALS
WEIGHT: 126 LBS | SYSTOLIC BLOOD PRESSURE: 116 MMHG | HEART RATE: 105 BPM | HEIGHT: 62 IN | DIASTOLIC BLOOD PRESSURE: 72 MMHG | RESPIRATION RATE: 20 BRPM | BODY MASS INDEX: 23.19 KG/M2

## 2021-06-04 VITALS
WEIGHT: 120.5 LBS | HEIGHT: 62 IN | HEART RATE: 99 BPM | DIASTOLIC BLOOD PRESSURE: 85 MMHG | SYSTOLIC BLOOD PRESSURE: 124 MMHG | BODY MASS INDEX: 22.18 KG/M2

## 2021-06-05 VITALS
DIASTOLIC BLOOD PRESSURE: 67 MMHG | SYSTOLIC BLOOD PRESSURE: 102 MMHG | HEART RATE: 68 BPM | RESPIRATION RATE: 16 BRPM | BODY MASS INDEX: 24.56 KG/M2 | OXYGEN SATURATION: 99 % | WEIGHT: 130 LBS

## 2021-06-05 VITALS
BODY MASS INDEX: 26.7 KG/M2 | DIASTOLIC BLOOD PRESSURE: 65 MMHG | RESPIRATION RATE: 16 BRPM | HEART RATE: 96 BPM | WEIGHT: 139 LBS | SYSTOLIC BLOOD PRESSURE: 104 MMHG

## 2021-06-05 VITALS
OXYGEN SATURATION: 99 % | SYSTOLIC BLOOD PRESSURE: 109 MMHG | WEIGHT: 139 LBS | HEART RATE: 106 BPM | BODY MASS INDEX: 26.7 KG/M2 | RESPIRATION RATE: 16 BRPM | DIASTOLIC BLOOD PRESSURE: 73 MMHG

## 2021-06-05 VITALS
OXYGEN SATURATION: 99 % | WEIGHT: 130 LBS | HEART RATE: 102 BPM | DIASTOLIC BLOOD PRESSURE: 79 MMHG | SYSTOLIC BLOOD PRESSURE: 119 MMHG | RESPIRATION RATE: 16 BRPM | BODY MASS INDEX: 24.17 KG/M2

## 2021-06-05 VITALS
HEART RATE: 89 BPM | BODY MASS INDEX: 28.04 KG/M2 | RESPIRATION RATE: 16 BRPM | DIASTOLIC BLOOD PRESSURE: 73 MMHG | WEIGHT: 146 LBS | SYSTOLIC BLOOD PRESSURE: 108 MMHG

## 2021-06-05 VITALS
DIASTOLIC BLOOD PRESSURE: 72 MMHG | HEART RATE: 106 BPM | WEIGHT: 140 LBS | BODY MASS INDEX: 26.89 KG/M2 | SYSTOLIC BLOOD PRESSURE: 105 MMHG | RESPIRATION RATE: 16 BRPM | OXYGEN SATURATION: 99 %

## 2021-06-05 VITALS
WEIGHT: 135.6 LBS | HEART RATE: 107 BPM | OXYGEN SATURATION: 97 % | HEIGHT: 61 IN | BODY MASS INDEX: 25.6 KG/M2 | SYSTOLIC BLOOD PRESSURE: 121 MMHG | TEMPERATURE: 98.1 F | DIASTOLIC BLOOD PRESSURE: 69 MMHG

## 2021-06-05 VITALS
OXYGEN SATURATION: 99 % | BODY MASS INDEX: 27.28 KG/M2 | WEIGHT: 142 LBS | HEART RATE: 102 BPM | SYSTOLIC BLOOD PRESSURE: 116 MMHG | RESPIRATION RATE: 16 BRPM | DIASTOLIC BLOOD PRESSURE: 74 MMHG

## 2021-06-06 NOTE — TELEPHONE ENCOUNTER
RN triage call from patient  She reports that she has been vomiting since 2/20/2020. She does not know if it could be alcohol poisoning or something else.  She reports vomiting every 30 minutes since then. Today is a little better. Patient reports not eating as well.      Gave disposition to be seen in ED now for evaluation and treatment. Patient stated understanding and had no further questions.  Maria Elena Freeman, RN  [facility] Triage Nurse  12:20 PM  2/24/2020        Reason for Disposition    SEVERE vomiting (e.g., 6 or more times/day)    Protocols used: VOMITING-A-OH

## 2021-06-11 NOTE — PROGRESS NOTES
Chief Complaint:  Chief Complaint   Patient presents with     Preg. Conf.     HPI:   Caroline Rowe is a 24 y.o. female is here for pregnancy intake.  She is .  Patient's last menstrual period was 2020 (exact date).  Positive pregnancy test 20 in clinic.  Positive home pregnancy test 2 weeks prior.  U/S done at about 11 weeks gestation, gave due date of 3/15/21.  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    Past medical history: reviewed and updated.  No past medical history on file.  History reviewed. No pertinent surgical history.    Current Outpatient Medications:      prenat.vits,grady,min-iron-folic Tab, Take 1 tablet by mouth daily., Disp: 100 each, Rfl: 3    Social:  Social History     Tobacco Use     Smoking status: Never Smoker     Smokeless tobacco: Never Used   Substance Use Topics     Alcohol use: Not Currently     Alcohol/week: 3.0 standard drinks     Types: 3 Standard drinks or equivalent per week     Drug use: No       OBJECTIVE:  No Known Allergies  Vitals:    20 0853   BP: 124/85   Pulse: 99     Body mass index is 22.4 kg/m .    Vital signs stable as recorded above  General: Patient is alert and oriented x 3, in no apparent distress  Fetal Exam: Fundal height was not palpable, fetal heart tones are heard at 155 bpm.    Results:  Results for orders placed or performed in visit on 20   Culture, Urine    Specimen: Urine   Result Value Ref Range    Culture Mixture of urogenital organisms    Chlamydia/gonorrhoeae, URINE    Specimen: Urine, Voided; Body Fluid   Result Value Ref Range    Chlamydia trachomatis, Amplified Detection Negative Negative    Neisseria gonorrhoeae, Amplified Detection Negative Negative   Pregnancy (Beta-hCG, Qual), Urine   Result Value Ref Range    Pregnancy Test, Urine Positive (!) Negative   ABO/RH Typing (OP order)   Result Value Ref Range    HML ABO/Rh Typing O POS     HML ABO/Rh Repeat Typing O POS    Hepatitis  B Surface antigen (HBsAG)   Result Value Ref Range    Hepatitis B Surface Ag Negative Negative   HIV Antigen/Antibody Screening Cascade   Result Value Ref Range    HIV Antigen / Antibody Negative Negative   HML Antibody Screen   Result Value Ref Range    HML Antibody Screen Negative Negative   Lead, Blood   Result Value Ref Range    Lead      Collection Method Venous    RPR   Result Value Ref Range    Treponema Antibody (Syphilis) Negative Negative   Rubella Immune Status (IgG)   Result Value Ref Range    Rubella Antibody, IgG Positive    Drugs of Abuse 1,Urine   Result Value Ref Range    Amphetamines Screen Negative Screen Negative    Benzodiazepines Screen Negative Screen Negative    Opiates Screen Negative Screen Negative    Phencyclidine Screen Negative Screen Negative    THC Screen Negative Screen Negative    Barbiturates Screen Negative Screen Negative    Cocaine Metabolite Screen Negative Screen Negative    Oxycodone Screen Negative Screen Negative    Creatinine, Urine 149.6 mg/dL   Urinalysis, OB Screen   Result Value Ref Range    Glucose, UA Negative Negative    Ketones, UA Negative Negative    Protein, UA Negative Negative mg/dL   HM1 (CBC with Diff)   Result Value Ref Range    WBC 12.6 (H) 4.0 - 11.0 thou/uL    RBC 4.56 3.80 - 5.40 mill/uL    Hemoglobin 9.3 (L) 12.0 - 16.0 g/dL    Hematocrit 30.1 (L) 35.0 - 47.0 %    MCV 66 (L) 80 - 100 fL    MCH 20.4 (L) 27.0 - 34.0 pg    MCHC 30.9 (L) 32.0 - 36.0 g/dL    RDW 17.1 (H) 11.0 - 14.5 %    Platelets 277 140 - 440 thou/uL    MPV 11.3 8.5 - 12.5 fL    Neutrophils % 77 (H) 50 - 70 %    Lymphocytes % 15 (L) 20 - 40 %    Monocytes % 6 2 - 10 %    Eosinophils % 1 0 - 6 %    Basophils % 0 0 - 2 %    Immature Granulocyte % 1 (H) <=0 %    Neutrophils Absolute 9.7 (H) 2.0 - 7.7 thou/uL    Lymphocytes Absolute 1.9 0.8 - 4.4 thou/uL    Monocytes Absolute 0.8 0.0 - 0.9 thou/uL    Eosinophils Absolute 0.1 0.0 - 0.4 thou/uL    Basophils Absolute 0.0 0.0 - 0.2 thou/uL     Immature Granulocyte Absolute 0.2 (H) <=0.0 thou/uL   Lead, Blood, Venous   Result Value Ref Range    Lead, Blood (Venous) <2.0 <=4.9 ug/dL     Other screening pregnancy lab work is ordered and pending.    Patient scored a 2/30 on [place]  Depression Screen.    Assessment and Plan:  1. Pregnancy Intake Appointment.  Caroline Rowe is 24 y.o. and .  Patient's last menstrual period was 2020 (exact date).  Estimated Date of Delivery: 3/15/21  Screening pregnancy lab work was drawn.  Prenatal vitamins prescribed.  Problem list and current medications reviewed and updated.  Dating ultrasound ordered.  Prenatal info packet given.    Follow up in 4 weeks.  Please see OB Episode for complete details.  Visit was 40 minutes, greater than 50% of time spent in face-to-face counseling and coordination of above care.    This dictation uses voice recognition software, which may contain typographical errors.

## 2021-06-11 NOTE — PATIENT INSTRUCTIONS - HE
Pregnancy: 15 weeks    Due Date: March 15, 2021    Next visit in about 4 weeks    I will contact you through [employer] about who your OB doctor will be.

## 2021-06-11 NOTE — TELEPHONE ENCOUNTER
New Appointment Needed  What is the reason for the visit:    First OB Appt Request  Have you had a pregnancy confirmation appointment at a [facility] primary care clinic?:  No. - went to [facility] on 8/15/20 and received a letter of pregnancy confirmation.  Provider Preference: Dr. Betty Bush (Pt states that she has family members who already see Dr. Bush)  How soon do you need to be seen?: next week  Waitlist offered?: No  Okay to leave a detailed message:  YES

## 2021-06-12 NOTE — TELEPHONE ENCOUNTER
"Received a message stating call can not be completed at this time. Will try again later.     .\" Okay to relay message\"    "

## 2021-06-12 NOTE — TELEPHONE ENCOUNTER
Called and spoke with Caroline Rowe , Message was given, Caroline Rowe  understood, no further questions.

## 2021-06-12 NOTE — PATIENT INSTRUCTIONS - HE
"  Patient Education   HEALTHY PREGNANCY CARE: 18-22 WEEKS PREGNANT    Your baby is continuing to develop quickly. At this stage, babies can now suck their thumbs,  firmly with their hands, and are beginning to hear.    Sometime between 18 and 22 weeks, you will start to feel your baby move. At first, these small fetal movements feel like fluttering or \"butterflies.\" Some women say that they feel like gas bubbles. As the baby grows, these movements will become stronger and be able to be felt through your abdomen.     Nutrition: During this time, you may find that your nausea and fatigue are gone. Overall, you may feel better and have more energy than you did in your first trimester. Be sure you are getting enough calcium and iron in your diet. Your prenatal vitamins cannot supply all of the nutrients you need, so continue to eat 3-4 servings of dairy foods and 2-3 servings of meat/fish/poultry/nuts every day. Foods high in iron include: red meats, eggs, dark green vegetables, dark yellow vegetables, nuts, kidney beans and chickpeas. Some cereals are fortified with iron, so look at the food labels for 100% of the daily requirement for iron.     [place] for childbirth and parenting classes, including an infant CPR class. Breastfeeding classes are recommended too.    Plan for the gestational diabetes screening between weeks 24-28. You can eat normally before that visit; we would suggest making sure you have protein foods, but not a lot of carbohydrates or sugary foods.    Your blood type was determined at your first OB appointment. If you are Rh negative, you should discuss the need for a Rhogam shot with your midwife or physician.     If you had a  birth in the past, discuss a trial of labor with your midwife or physician. He or she may ask that you obtain your operative report from that  if you are wanting to have a vaginal birth after  () this time.     Think about the support you " have, and what help you can plan on from family and friends, after your baby is born. Many mothers and babies are ready to go home from the hospital within a few days. Your clinic staff is available to assist you and the [facility] staff is available to you after hours. Start preparing your other children for changes they'll experience with the new baby. Explore day care options.    You may find that you have new discomforts now, such as sleep problems or leg cramps. To access information that can help you ease these discomforts, you can refer to the Starting Out Right book or find it online at [email or web address] or [email or web address]    You can sign up for a weekly parenting e-mail that gives support, tips and advice from health care professionals that starts with pregnancy and continues through the toddler years. To register, go to [email or web address] at any time during your pregnancy.    Watch for the warning signs of premature labor:     Dull, low backache    Contractions of the uterus, menstrual-like cramps    Abdominal cramping with or without diarrhea    More pelvic pressure    Increase or change in vaginal discharge.     Remember that labor doesn't have to hurt. Never hesitate to call your midwife or physician or their staff at [facility] at Phone: [phone] for any one of these warning signs - or if something just doesn't feel right. If it's after clinic hours, physician patients should call the [facility] at [phone] ([phone]); midwife patients should call their answering service at [phone].

## 2021-06-12 NOTE — TELEPHONE ENCOUNTER
Thalassemia is genetic. It cannot be treated or cured.    Her genes don't tell her body to make enough protein for the red blood cells.  So she doesn't make enough red blood cells.  When the red blood cells are low that is called anemia.    She should take her prenatal vitamin every day, and an iron pill every other day.   This prevents her from also getting low on iron, which can make the red blood cells even lower.    Babies can get thalassemia.   But it is only severe if BOTH parents have it.  Which is why we should check her , too.

## 2021-06-12 NOTE — TELEPHONE ENCOUNTER
Called and spoke to pt. She is wondering if the thalassemia is hereditary? Is this harmful to the baby? Does this have to do with her hemoglobin? Is there anything she can do to improve this. Please advise.

## 2021-06-12 NOTE — PROGRESS NOTES
First OB Appointment    Assessment & Plan:  1. Dating: Final EDC is 3/13/2021. Based on sure LMP consistent with reported EDC from first trimester US outside.  2. Aspirin: Based on her risk factors, Caroline is NOT at high risk of preeclampsia. Low-dose aspirin prophylaxis is NOT recommended for prevention of preeclampsia.   3. Weight: Based on prepregnancy BMI of 21.94, recommended weight gain of 25 lb (11.5 kg)-35 lb (16 kg).  4. Trisomy screening: quad screen done. Anatomy us ordered.  5. Carrier screening for SMA and CF: not discussed.  6. Breast lump - clinically larger than in 2018. Breast US ordered.   7. Yeast vaginitis - treat with monistat.  8. Microcytic anemia. Likely thalassemia. Check iron and hemoglobin electrophoresis.  9. Genital warts. Discussed cryotherapy.    Order Summary                                                      1. Supervision of normal first pregnancy in second trimester  Urinalysis, OB Screen (ketones, glucose, protein)    Hemoglobinopathy/Thalassemia Cascade    Iron and Transferrin Iron Binding Capacity    Maternal Serum Screen, Alpha Fetoprotein, hCG, Estriol, and Inhibin A (Quad)    US OB > = 14 Weeks   2. Pregnancy     3. Cervical cancer screening  Gynecologic Cytology (PAP Smear)   4. Yeast vaginitis  miconazole (MONISTAT 7) 2 % vaginal cream   5. Microcytic anemia  HM2(CBC w/o Differential)   6. Lump or mass in breast  US Breast Right Limited 1-3 Quadrants      Future Appointments   Date Time Provider Department Center   12/1/2020  5:20 PM Betty Bush MD [facility]       Completed by: Betty Bush M.D., [facility]. 10/27/2020 2:45 PM.  This transcription uses voice recognition software, which may contain typographical errors.    Subjective:  This is a planned pregnancy. The patient feels happy about it. Current pregnancy symptoms include: none.     Preeclampsia risk factors:    High risk factors (1+): NONE    Moderate risk factors (2+):  nulliparity     I reviewed the following components of the patient chart today:    OB intake note    Active problems    Past Medical History    Medications    Allergies    Family History    Social History    Genetic Questionairre    Prenatal Labs    Prenatal Ultrasound    OB history  OB History    Para Term  AB Living   1   0         SAB TAB Ectopic Multiple Live Births                  # Outcome Date GA Lbr Fernando/2nd Weight Sex Delivery Anes PTL Lv   1 Current                Objective:  Vitals:    10/27/20 1727   BP: 116/72   Pulse: (!) 105   Resp: 20    Body mass index is 23.42 kg/m .   Wt Readings from Last 6 Encounters:   10/27/20 126 lb (57.2 kg)   20 120 lb 8 oz (54.7 kg)   11/15/19 118 lb (53.5 kg)   18 122 lb 11.2 oz (55.7 kg)   18 123 lb (55.8 kg)       See prenatal flowsheet and physical exam portion of prenatal episode.    Total time 40 minutes, >50% spent in counseling and coordination of care regarding new pregnancy and review of patient's current health status and pregnancy.

## 2021-06-13 NOTE — TELEPHONE ENCOUNTER
Called and spoke to pt. Pt will call back to schedule 3 hour glucose. She needs to figure out what day she can come in. Okay to schedule lab appointment upon return call.      ----- Message from Betty Bush MD sent at 12/3/2020  2:51 PM CST -----  Please call patient with results (see patient result comments).  Schedule 3hr GTT within one week.

## 2021-06-13 NOTE — TELEPHONE ENCOUNTER
Orders being requested: Patient would like the order for the 3 hour glucose test to be transferred to the [facility] at fax number, [phone]. The patient will not be able to come in to the site clinic due to that week being the holiday week. Patient will ask the [facility] fax results to the provider when they are available. Also the patient would like a call back when this request has been taken care of.  Reason service is needed/diagnosis: To be able to get lab testing done at another location outside of [facility].  When are orders needed by: as soon as possible  Where to send Orders: Fax: [phone]  Okay to leave detailed message?  Yes

## 2021-06-13 NOTE — PROGRESS NOTES
Subjective:    Will call to schedule breast biopsy. Feeling nervous about it. I strongly encouraged her to do this.    OB ROS:   Headache: None.   Vision change: None.   Abnormal vaginal discharge: None.   Bleeding: None.   Fetal movement: Normal.     Objective:  Vitals:    12/01/20 1706   BP: 119/79   Pulse: (!) 102   Resp: 16   SpO2: 99%      Prepregnancy BMI: 21.94. Total weight gain: 12 lb (5.443 kg)   Exam: see flowsheet.    No visits with results within 1 Week(s) from this visit.   Latest known visit with results is:   Routine Prenatal on 10/27/2020   Component Date Value Ref Range Status     Glucose, UA 10/27/2020 Negative  Negative Final     Ketones, UA 10/27/2020 Negative  Negative Final     Protein, UA 10/27/2020 Negative  Negative mg/dL Final     Hgb A2 Quant 10/27/2020 6.0* 2.2 - 3.5 % Final     Hgb F Quant 10/27/2020 2.2* 0.0 - 2.0 % Final     Hemoglobin,ELP 10/27/2020 Alpha thalassemia trait   Final     Path ICD: 10/27/2020 D64.9   Final     Interpreted By: 10/27/2020 Eduardo Ma MD   Final     Iron 10/27/2020 96  42 - 175 ug/dL Final     Transferrin 10/27/2020 202* 212 - 360 mg/dL Final     Transferrin Saturation, Calculated 10/27/2020 38  20 - 50 % Final     Transferrin IBC, Calculated 10/27/2020 252* 313 - 563 ug/dL Final     Patient's AFP 10/27/2020 69  ng/mL Final     MoM for AFP 10/27/2020 1.01   Final     Patient's uE3 10/27/2020 2.36  ng/mL Final     MoM for uE3 10/27/2020 0.88   Final     Patient's hCG, 2nd Trimester 10/27/2020 21497  IU/L Final     hCG MoM, 2nd Trimester 10/27/2020 0.98   Final     Patient's CHARLIE 10/27/2020 218  pg/mL Final     MoM for CHARLIE 10/27/2020 1.16   Final     Maternal Screen Interpretation 10/27/2020 Screen Neg   Final    Comment: INTERPRETATION: SCREEN NEGATIVE                                Neural Tube Defects (NTD)   Negative       Down syndrome (DS)          Negative       Trisomy 18 (T18)            Negative                                                                  Pre-Test     Post-Test    Cutoff                                       Neural Tube Defects Risks   1:1030       < 1:30719    1:250          Down Syndrome Risks         1:1070       1:8560       1:150          Trisomy 18 Risks            1:4180       < 1:05554    1:100                                        Comments:                                                   The risk of an open neural tube defect is less than the  screening cut-off.                                          The risk of Down syndrome is less than the screening  cut-off.                                                    The risk of trisomy 18 is less than the screening cut-off.  Test developed and characteristics determined by [facility]. See Compliance                            Statement B: [employer]/CS     Maternal Age At Delivery 10/27/2020 24.2  yr Final     Maternal Weight 10/27/2020 126.0 lbs.   Final     Estimated Due Date 10/27/2020 03-13-21   Final     Gestational Age Calculated at Konstantin* 10/27/2020 20 wks, 3 days   Final     Dating 10/27/2020 LMP CONF by US   Final     Number of Fetuses 10/27/2020 [place]   Final     Maternal Race 10/27/2020 Nonblack   Final     Insulin Req Maternal Diabetes 10/27/2020 No   Final     Smoking 10/27/2020 No   Final     Family Hx Neural Tube Defect 10/27/2020 No   Final     Family History of Aneuploidy 10/27/2020 No   Final      Specimen  10/27/2020 See Note   Final    Initial sample     EER Maternal Serum, Quad 10/27/2020 See Note   Final    Access [employer] Enhanced Report using the link below:     -Direct access:   [email or web address]  Performed By: [facility]  [address] [place] [id number]  : Zoë Mtz MD     Case Report 10/27/2020    Final                    Value:Gynecologic Cytology Report                       Case: [id number]                                   Authorizing Provider:  Betty Bush,  MD        Collected:           10/27/2020 1815              Ordering Location:     [facility]   Received:            10/27/2020 1815                                     [facility]                                                                  First Screen:          Aixa Alvarez, CT                                                                            (ASCP)                                                                       Specimen:    SUREPATH PAP, SCREENING, Endocervical/cervical                                              Interpretation 10/27/2020 Negative for squamous intraepithelial lesion or malignancy  Negative for squamous intraepithelial lesion or malignancy.  Final     Result Flag 10/27/2020 Normal  Normal Final     Other Findings 10/27/2020 Fungal organisms morphologically consistent with Candida spp   Final     Specimen Adequacy 10/27/2020 Satisfactory for eval, endocerv/transform zone component absent, patient pregnant   Final     HPV Reflex? 10/27/2020 Yes if Abnormal   Final     HIGH RISK 10/27/2020    Final                    Value:No     LMP/Menopause Date 10/27/2020    Final                    Value:6/6/20     Abnormal Bleeding 10/27/2020    Final                    Value:No     Pt Status 10/27/2020    Final                    Value:Pregnant     Birth Control/Hormones 10/27/2020    Final                    Value:None     Previous Normal/Date 10/27/2020    Final                    Value:see charte     Prev Abn Date/Dx 10/27/2020    Final                    Value:see chart     Cervical Appearance 10/27/2020    Final                    Value:Normal     WBC 10/27/2020 13.9* 4.0 - 11.0 thou/uL Final     RBC 10/27/2020 3.94  3.80 - 5.40 mill/uL Final     Hemoglobin 10/27/2020 8.2* 12.0 - 16.0 g/dL Final     Hematocrit 10/27/2020 26.2* 35.0 - 47.0 % Final     MCV 10/27/2020 67* 80 - 100 fL Final     MCH 10/27/2020 20.8* 27.0 - 34.0 pg Final     MCHC 10/27/2020 31.3* 32.0  - 36.0 g/dL Final     RDW 10/27/2020 15.6* 11.0 - 14.5 % Final     Platelets 10/27/2020 262  140 - 440 thou/uL Final     MPV 10/27/2020 8.5  7.0 - 10.0 fL Final         Assessment/Plan:    24 y.o.  at 25w3d.    Due to prepregnancy BMI of 21.94, weight gain of 12 lb (5.443 kg) is appropriate for prepregnancy BMI. The following recommendations were made: continue current diet and activity.     Breast lump - recommend breast biopsy    Order Summary                                                      1. Pregnancy  cholecalciferol, vitamin D3, 50 mcg (2,000 unit) Tab    ascorbic acid, vitamin C, (VITAMIN C) 500 mg Chew chew tab      Completed by: Betty Bush M.D., [facility]. 2020 5:13 PM.  Total time: 15 minutes. Greater than 50% spent in counseling and coordination of care regarding topics, above.

## 2021-06-13 NOTE — TELEPHONE ENCOUNTER
Please call radiology to see when the patient's breast biopsy is scheduled for. It was ordered on 11/12/20 and I don't see a pending appointment for biopsy or documentation that the patient was called to schedule it.    Future Appointments   Date Time Provider Department Center   12/1/2020  5:20 PM Betty Bush MD [facility]

## 2021-06-13 NOTE — TELEPHONE ENCOUNTER
,  [facility] called pt on 11/13 there was no answer so they left message to call them to schedule [phone].  I called pt. Today she said she will call when she is able to figure how her work schedule she said since she has to do a covid test prior to having bx she needs to figure her days off. I gave her the number to call [phone] #[phone] when she is ready.  facundo Espinosa

## 2021-06-13 NOTE — PATIENT INSTRUCTIONS - HE
Alpha Thalassemia Trait  Jose F needs to be checked for thalassemia with two tests:  -CBC (complete blood count)  -hemoglobin eletrophoresis  Please have him get these done and bring me the results.  He is welcome to do them at [facility], if you want, just let me know.    Consider Carrier Screening  Cystic Fibrosis  Spinal Muscular Atropy    Prenatal Classes  Recommended: Birthing Class + Breast feeding Class  Jitendra Newby  Consider a  - LIANA    Shots  All caregivers and people who live with baby need:  -Flu shot  -Whooping cough (pertussis) shot

## 2021-06-13 NOTE — TELEPHONE ENCOUNTER
Order faxed to requested fax number. Called and left detailed message with patient that this was done.

## 2021-06-14 NOTE — TELEPHONE ENCOUNTER
Please have Caroline come in today for an OB check. Let her know that we should monitor for contractions (we'll do an NST) and check her cervix.

## 2021-06-14 NOTE — CONSULTS
[facility] Hematology and Oncology Consult Note    Patient: Caroline Rowe  MRN: [id number]  Date of Service: 01/08/2021        Reason for Visit    I was consulted by Dr. Bush regarding anemia.    Assessment/Plan    Ms. Caroline Rowe is a 25 y.o. woman who has a microcytic anemia.  This was detected only when she became pregnant with her first child.  She is now at 7 months and the expected date of delivery is March 13.  She is of [facility] ethnic origin.  Her family comes from [facility].  She does not give a history of menorrhagia or other bleeding prior.  Diet is essentially normal.  She does not appear very symptomatic from the anemia.  The single episode of feeling faint and shortness of breath on walking a distance appears to be quite consistent with her pregnancy.    I have reviewed her previous medical records.  I have reviewed her outside medical records from 2013.  I have reviewed her blood counts from 9/23/2020, 10/27/2020 and 12/2/2020.  TIBC panel from 10/27/2020 and 12/2/2020.  Reviewed the reports of ultrasound of breast from 2/12/2018 and 11/12/2020.    1.  I discussed with her that she has a microcytic anemia.  Microcytic anemia can be from iron deficiency or it could be from the thalassemia.  When somebody is pregnant, iron deficiency is common.  The lab results from recently have an iron panel but it is not quite clear that she is iron replete.  The other possibility is a thalassemia.  I discussed with her that thalassemia is most common among people with [place]  ancestry.  Thus that is a possibility in her.  Unfortunately we do not have old blood counts so we do not know whether she had anemia before she came pregnant or not.  The problem is that it is difficult to make a thalassemia diagnosis based on hemoglobin electrophoresis unless we are sure that the patient is iron replete.  I think we need to do retesting to be sure.  There is the possibility that she has a combination of thalassemia and iron  deficiency also.  She voiced understanding and was agreeable to doing the testing.    2.I also discussed with her that if she has a thalassemia trait and her  also has a thalassemia trait, there is a possibility that the child can be more severely affected by inheriting genes from both sides.  Thus if she is confirmed to have thalassemia, we will have to discuss about genetic testing etc.  She voiced understanding.    3.  Regarding the right breast mass, previous imaging was consistent with a fibroadenoma.  It can increase in size with a hormonal changes of pregnancy.  However the increase in size warrants follow-up.  She is considering the possibility of having an ultrasound-guided biopsy.  She does not think that I need to be involved in the care for that.  I told her that if it turns out to be something more serious, I am certainly available to help her.    4.  We will obtain the following labs today: A peripheral blood morphology, reticulocyte count, ferritin, TIBC panel and a hemoglobinopathy/thalassemia cascade.    5.  I asked her to continue taking the prenatal vitamin daily and the additional iron tablet also daily.    6.  She will make a follow-up appointment with me after 1 week as a video visit to discuss the lab results.    ECOG Performance      Distress Assessment           Problem List    1. Microcytic anemia  Morphology,Smear Review (MORP)    Ferritin    Iron and Transferrin Iron Binding Capacity    Reticulocytes    Hemoglobinopathy/Thalassemia Cascade   2. Pregnancy             CC: Betty Bush MD      ______________________________________________________________________________      History    Ms. Caroline Rowe is a 25-year-old woman who is here to discuss about her anemia.  She is pregnant and in her seventh month.  Expected date of delivery is March 13.    She states that she was found to be anemic only in this pregnancy.  She does not member having her blood counts checked prior to  this.    She says that she did feel faint once a couple of weeks ago.  She did not actually fall down.  She also felt like she was overheating now and then.  She does have some shortness of breath on effort.  She can walk only for about 15 to 20 minutes before she feels tired.  On the other hand she is able to do all the work at her workplace and again at home.    She states that she is gaining weight as expected with the delivery and the pregnancy is thought to be progressing well.    She did not have heavy menstrual bleeding before she became pregnant.    This is her first pregnancy.    She is taking a prenatal vitamin daily and she is taking additional iron tablet and she is tolerating that quite well.    Another issue that she has had is a mass in the right breast that has been there for several years.  She feels that with pregnancy it is slightly bigger in size.  She is considering having a biopsy done from the mass but she is not sure whether she wants to follow through with that.  She would like her primary physician to handle that.    A 14 point review of system is otherwise negative.  Please see below.    Past History  Past Medical History:   Diagnosis Date     Breast mass, right 2/5/2018     History reviewed. No pertinent surgical history.  Family History   Problem Relation Age of Onset     No Medical Problems Mother      No Medical Problems Father      No Medical Problems Sister      No Medical Problems Brother      Social History     Socioeconomic History     Marital status: Single     Spouse name: None     Number of children: None     Years of education: None     Highest education level: None   Occupational History     None   Social Needs     Financial resource strain: None     Food insecurity     Worry: None     Inability: None     Transportation needs     Medical: None     Non-medical: None   Tobacco Use     Smoking status: Never Smoker     Smokeless tobacco: Never Used   Substance and Sexual Activity      Alcohol use: Not Currently     Alcohol/week: 3.0 standard drinks     Types: 3 Standard drinks or equivalent per week     Drug use: No     Sexual activity: Never     Partners: Male     Birth control/protection: None   Lifestyle     Physical activity     Days per week: None     Minutes per session: None     Stress: None   Relationships     Social connections     Talks on phone: None     Gets together: None     Attends [employer] service: None     Active member of club or organization: None     Attends meetings of clubs or organizations: None     Relationship status: None     Intimate partner violence     Fear of current or ex partner: None     Emotionally abused: None     Physically abused: None     Forced sexual activity: None   Other Topics Concern     None   Social History Narrative     None     Allergies    No Known Allergies    Review of Systems    General  General (WDL): Exceptions to WDL  ENT  ENT (WDL): All ENT elements are within defined limits  Respiratory  Respiratory (WDL): All respiratory elements are within defined limits  Cardiovascular  Cardiovascular (WDL): All cardiovascular elements are within defined limits  Endocrine  Endocrine (WDL): All endocrine elements are within defined limits  Gastrointestinal  Gastrointestinal (WDL): All gastrointestinal elements are within defined limits  Musculoskeletal  Musculoskeletal (WDL): All musculoskeletal elements are within defined limits  Neurological  Neurological (WDL): All neurological elements are within defined limits  Dominant Hand: Right  Psychological/Emotional  Psychological/Emotional (WDL): Exceptions to WDL  Insomnia: Yes - Recent (Less than 3 months)  Daytime sleepiness: Yes - Recent (Less than 3 months)  Hematological/Lymphatic  Hematological/Lymphatic (WDL): All hematological/lymphatic elements are within defined limits  Dermatological  Dermatologic (WDL): All dermatological elements are within defined  "limits  Genitourinary/Reproductive  Genitourinary/Reproductive (WDL): All genitourinary/reproductive elements are within defined limits  Reproductive (Females only)  Menstrual irritation or increase in discharge: No  Age at start of periods: 16  Last menstural cycle: 06/06/2020  Number of pregnancies: 1  Age at first pregnancy: 24  Patient Pregnant?: Yes  Is the patient trying to get pregnant?: No  Is the patient on birth control: No  Pain  Currently in Pain: No/denies    Physical Exam    Recent Vitals 1/8/2021   Height 5' 0.5\"   Weight 135 lbs 10 oz   BSA (m2) 1.62 m2   /69   Pulse 107   Temp 98.1   Temp src 1   SpO2 97   Some recent data might be hidden       GENERAL: Alert and oriented to time place and person. Seated comfortably. In no distress.  She looks well overall.  Small build.  Very pleasant.    HEAD: Atraumatic and normocephalic.    EYES: HOLLI, EOMI.  No pallor.  No icterus.    Oral cavity: no mucosal lesion or tonsillar enlargement.    NECK: supple. JVP normal.  No thyroid enlargement.    LYMPH NODES: No palpable, cervical, axillary or inguinal lymphadenopathy.    CHEST: clear to auscultation bilaterally.  Resonant to percussion throughout bilaterally.  Symmetrical breath movements bilaterally.    CVS: S1 and S2 are heard. Regular rate and rhythm.  No murmur or gallop or rub heard.  No peripheral edema.    ABDOMEN: Soft. Not tender.  Abdomen distended by gravid uterus.  No contractions.  No palpable hepatomegaly or splenomegaly.  No other mass palpable.  Bowel sounds heard.    EXTREMITIES: Warm.    SKIN: no rash, or bruising or purpura.  Has a full head of hair.      Lab Results  Results for CYNTHIA NIETO (MRN [id number]) as of 1/8/2021 13:33   Ref. Range 9/23/2020 09:55 10/27/2020 18:08 12/2/2020 17:51   WBC Latest Ref Range: 4.0 - 11.0 thou/uL 12.6 (H) 13.9 (H) 16.1 (H)   RBC Latest Ref Range: 3.80 - 5.40 mill/uL 4.56 3.94 3.83   Hemoglobin Latest Ref Range: 12.0 - 16.0 g/dL 9.3 (L) 8.2 (L) 8.1 " (L)   Hematocrit Latest Ref Range: 35.0 - 47.0 % 30.1 (L) 26.2 (L) 25.9 (L)   MCV Latest Ref Range: 80 - 100 fL 66 (L) 67 (L) 68 (L)   MCH Latest Ref Range: 27.0 - 34.0 pg 20.4 (L) 20.8 (L) 21.2 (L)   MCHC Latest Ref Range: 32.0 - 36.0 g/dL 30.9 (L) 31.3 (L) 31.4 (L)   RDW Latest Ref Range: 11.0 - 14.5 % 17.1 (H) 15.6 (H) 16.1 (H)   Platelets Latest Ref Range: 140 - 440 thou/uL 277 262 251   MPV Latest Ref Range: 7.0 - 10.0 fL 11.3 8.5 8.2   Neutrophils % Latest Ref Range: 50 - 70 % 77 (H)     Lymphocytes % Latest Ref Range: 20 - 40 % 15 (L)     Monocytes % Latest Ref Range: 2 - 10 % 6     Eosinophils % Latest Ref Range: 0 - 6 % 1     Basophils % Latest Ref Range: 0 - 2 % 0     Neutrophils Absolute Latest Ref Range: 2.0 - 7.7 thou/uL 9.7 (H)     Lymphocytes Absolute Latest Ref Range: 0.8 - 4.4 thou/uL 1.9     Monocytes Absolute Latest Ref Range: 0.0 - 0.9 thou/uL 0.8     Eosinophils Absolute Latest Ref Range: 0.0 - 0.4 thou/uL 0.1     Basophils Absolute Latest Ref Range: 0.0 - 0.2 thou/uL 0.0           Imaging Results    No results found.      Signed by: Vielka Uribe MD

## 2021-06-14 NOTE — PROGRESS NOTES
"Caroline Rowe is a 25 y.o. female who is being evaluated via a billable video visit.      The patient has been notified of following:     \"This video visit will be conducted via a call between you and your physician/provider. We have found that certain health care needs can be provided without the need for an in-person physical exam.  This service lets us provide the care you need with a video conversation.  If a prescription is necessary we can send it directly to your pharmacy.  If lab work is needed we can place an order for that and you can then stop by our lab to have the test done at a later time.    Video visits are billed at different rates depending on your insurance coverage. Please reach out to your insurance provider with any questions.    If during the course of the call the physician/provider feels a video visit is not appropriate, you will not be charged for this service.\"    Patient has given verbal consent to a Video visit? Yes  How would you like to obtain your AVS? AVS Preference: MyChart.  If dropped by the video visit, the video invitation should be sent to: Text to cell phone: [phone]  Will anyone else be joining your video visit? No        ____________________________    Virtual Visit - Video Encounter  [facility]  Family Medicine  Date of Service: 1/6/2021    Subjective:    Heartburn getting worse  Has has had whole pregnancy  Doesn't eat before bed, but worse at night    Faintness  At work felt like she was going to faint  Overheated, body sweaty    Breast Lump  Was planning on doing it, but having second thoughts.    OB ROS:   Headache: None.   Vision change: None.   Abnormal vaginal discharge: None.   Bleeding: None.   Fetal movement: Normal.     Objective:  There were no vitals filed for this visit.   Prepregnancy BMI: 21.94. Total weight gain: 12 lb (5.443 kg)   GENERAL: Healthy, alert and no distress  EYES: Eyes grossly normal to inspection. No discharge or " erythema, or obvious scleral/conjunctival abnormalities.  RESP: No audible wheeze, cough, or visible cyanosis.  No visible retractions or increased work of breathing.    NEURO: Cranial nerves grossly intact. Mentation and speech appropriate for age.  PSYCH: Mentation appears normal, affect normal/bright, judgement and insight intact, normal speech and appearance well-groomed      Assessment/Plan:    25 y.o.  at 29w5d.    Heartburn. Discussed lifestyle modification, TUMS and H2 blocker, if needed.    Lightheadedness. Likely orthostatic hypotension, based on description. Discussed hydration, carfeully getting up.    Breast mass. Strongly encouraged patient to proceed with breast biopsy. Heme/onc appt pending.    Anemia secondary to Iron deficiency and alpha Thalassemia. Taking iron regularly. Hgb 8.1, MCV 68. Encouraged her to have her  tested. Heme/onc appt pending.    Vitamins - discussed vitamin C and vitamin D are OK      Order Summary                                                      1. Alpha thalassemia trait       Future Appointments   Date Time Provider Department Center   2021 12:45 PM Vielka Uribe MD [facility]   2021  5:40 PM Betty Bush MD [facility]       Completed by: Betty Bush M.D., [facility]. 2021 5:15 PM.    ____________________________  Start visit: 5:15 PM  End visit: 5:35 PM    Video-Visit Details    Type of service:  Video Visit    Video End Time (time video stopped): 5:35 PM  Originating Location (pt. Location): Home    Distant Location (provider location):  [facility]     Platform used for Video Visit: Rosy Bush MD

## 2021-06-14 NOTE — PROGRESS NOTES
Subjective:     is going to get tested for thalassemia after baby is born and he can get insurance. In the meantime, plan is to maximize iron stores to minimize contribution of iron deficiency to her anemia which is primarily due to beta thalassemia trait + pregnancy.    OB ROS:   Headache: None.   Vision change: None.   Abnormal vaginal discharge: None.   Bleeding: None.   Fetal movement: Normal.     Objective:  Vitals:    21 1743   BP: 104/65   Pulse: 96   Resp: 16      Prepregnancy BMI: 22.66. Total weight gain: 21 lb (9.526 kg)   Exam: see flowsheet.  No results found for this or any previous visit (from the past 24 hour(s)).   Assessment/Plan:    25 y.o.  at 32w3d.    Due to prepregnancy BMI of 22.66, weight gain of 21 lb (9.526 kg) is appropriate for prepregnancy BMI. The following recommendations were made: continue current diet and activity.    Anemia - due to beta thal trait, iron deficiency, pregnancy. Continue iron supplement (life long).    Order Summary                                                      1. Supervision of other high risk pregnancies, third trimester  Urinalysis, OB Screen (ketones, glucose, protein)    Tdap vaccine greater than or equal to 8yo IM   2. Screening for viral disease  Hepatitis C Antibody (Anti-HCV)   3. Immunization due  Tdap vaccine greater than or equal to 8yo IM      Completed by: Betty Bush M.D., [facility]. 2021 6:05 PM.  Total time: 15 minutes. Greater than 50% spent in counseling and coordination of care regarding topics, above.

## 2021-06-14 NOTE — PATIENT INSTRUCTIONS - HE
Tdap (pertussis immunization) + Flu shot  Recommended for all of baby's caregivers and people who live with baby.    Pre-Registration  [email or web address] (go to bottom of page)  Patient Education   HEALTHY PREGNANCY CARE: 30-34 WEEKS PREGNANT    You have made it to the final months of your pregnancy. By now, your baby is starting to fill out with some fat under his skin, fuzzy hair on his shoulders, and is gaining 4 to 6 ounces per week.    Discuss any travel plans with your midwife or physician.    Review possible changes in sexuality during later pregnancy and discuss these with your midwife or physician, as well as your partner. Alternative love-making positions may be more comfortable.    Discuss labor and delivery issues with your midwife or physician. If you had a  birth in the past, discuss a trial of labor with your midwife or physician. He or she may ask that you sign a consent form, if you wish to have a vaginal birth after  (). Ask your midwife or physician to explain your options for managing pain during your labor and delivery. Sometimes, during the birth process, an episiotomy may need to be cut in the vagina to make the opening bigger or let the baby come out quicker. You may want to discuss the episiotomy and how often it is needed with your midwife or physician.    Plan for your baby's care by selecting a child health care provider (Family physician, Pediatrician, or Pediatric Nurse Practitioner). Practice installing an infant car seat correctly in the car. Ask for car seat information as needed and make sure it is safe and will work in the car your baby will ride in. You will need a car seat to bring your baby home from the hospital. Check the procedure for adding your baby to your health care plan. Review your decision about circumcision and ask for any information you need. As you buy and receive items for your baby, don't put a baby walker on your list. Walkers can be dangerous  and can cause serious injury to your child. A safer option is a saucer-type play station, since it doesn't allow baby to travel across the floor.    Discuss your choices and plans for birth control with your midwife or physician. Women who are breastfeeding can still become pregnant. Use a birth control method if you want to lower your pregnancy risk. Talk to your midwife or physician if you are considering permanent birth control, such as tubal ligation or Essure. You may need to complete a consent form 30 days prior to delivery in order to have this done after you deliver.    Continue to watch for signs and symptoms of preeclampsia:     Sudden swelling of your face, hands, or feet     New vision problems such as blurring, double vision, or flashing lights    A severe headache not relieved with acetaminophen (Tylenol)    Sharp or stabbing pain in your right or middle upper abdomen    Watch for signs and symptoms of premature labor:     Regular contractions. This means having about 6 or more within 1 hour, even after you have had a glass of water and are resting.     A backache that starts and stops regularly.    An increase or change in vaginal discharge, such as heavy, mucus-like, watery, or bloody discharge.     Your water breaks or leaks.    If you have any of the above symptoms or any other concerns, call your provider or their clinic staff at [facility] at Phone: [phone]. If it's after clinic hours, physician patients should call the [facility] at [phone] ([phone]); midwife patients should call their answering service at [phone].    How can you care for yourself at home?   You can refer to the Starting Out Right book or find it online at [email or web address]/[email or web address]-[email or web address]-[email or web address] or http://www.[email or web address]/images/stories/flipbooks/[email or web address]-starting-out-right/[facility]-starting-out-right.html#p=8     You can sign up for a  weekly parenting e-mail that gives support, tips and advice from health care professionals that starts with pregnancy and continues through the toddler years. To register, go to [email or web address] at any time during your pregnancy.

## 2021-06-14 NOTE — PROGRESS NOTES
Subjective:    Last week sometime, had right upper quadrant pain, severe. Started after eating stir morales. Couldn't sleep.   Had brown spotting. Like a light period.     Breast lump. Planning US after pregnancy to monitor.  Anemia. Tolerating iron well.    OB ROS:   Headache: None.   Vision change: None.   Abnormal vaginal discharge: None.   Bleeding: Yes. see above.   Fetal movement: Normal.     Objective:  Vitals:    21 1141   BP: 109/73   Pulse: (!) 106   Resp: 16   SpO2: 99%      Prepregnancy BMI: 22.66. Total weight gain: 21 lb (9.526 kg)   Exam: see flowsheet.  No RUQ tenderness.     Non Stress Test (NST)  Performed on 2021 at 34w2d.    Fetal Heart Rate Base Line: 140   Accelerations: Present  Reactive: Yes  Decelerations: None  Uterine Activity: Occasional every 4-7 minutes, palpate mild  Interpretation: Reactive NST      Recent Results (from the past 24 hour(s))   Urinalysis, OB Screen   Result Value Ref Range    Glucose, UA Negative Negative    Ketones, UA Negative Negative    Protein, UA Negative Negative mg/dL      Assessment/Plan:    25 y.o.  at 34w2d.    Due to prepregnancy BMI of 22.66, weight gain of 21 lb (9.526 kg) is appropriate for prepregnancy BMI. The following recommendations were made: continue current diet and activity.    Breast lump. Planning US after pregnancy to monitor.    Anemia. Tolerating iron well.    RUQ abdominal pain - check LFTs and US.     contractions without cervical change. Discussed monitoring for contractions and calling L&D if they increase in frequency or intensity.  Future Appointments   Date Time Provider Department Center   2021  5:00 PM Betty Bush MD [facility]      Order Summary                                                      1. Supervision of other high risk pregnancies, third trimester  Urinalysis, OB Screen   2. Breast lump - right breast     3. Right upper quadrant pain  Hepatic Profile    US Abdomen Limited       Completed by: Betty Bush M.D., [facility]. 2/1/2021 12:02 PM.  Total time: 20 minutes. Greater than 50% spent in counseling and coordination of care regarding topics, above.

## 2021-06-14 NOTE — PROGRESS NOTES
"I spoke with Caroline today via phone regarding her anemia. She is doing well, has few c/o. She is monitoring the right breast lump and says it's about the same size, non painful. She describes \"discomfort\" on her abd but relates it to her pregnancy. She is otherwise doing well. Julissa.  Caroline Rowe is a 25 y.o. female who is being evaluated via a billable video visit.      How would you like to obtain your AVS? MyChart.  If dropped from the video visit, the video invitation should be resent by: Text to cell phone: [phone]      Video Start Time: 9.04AM      Video-Visit Details    Type of service:  Video Visit    Video End Time (time video stopped): 9:54 AM  Originating Location (pt. Location): Home    Distant Location (provider location):  [facility]     Platform used for Video Visit: Landon Gallegos.    [facility] Hematology and Oncology Progress Note    Patient: Caroline Rowe  MRN: [id number]  Date of Service: 01/15/2021        Reason for Visit    Follow-up regarding microcytic anemia.    Assessment and Plan      ECOG Performance   ECOG Performance Status: 1    Distress Assessment  Distress Assessment Score: 5(life): She does not think any intervention from us is needed.    Pain   : No pain.    Ms. Caroline Rowe is a 25 y.o. woman with a microcytic anemia that was detected when she became pregnant with her first child. She is in her seventh month with the expected date of delivery being March 13. She is of ong ethnic origin with her family alternating from [facility].    1. I discussed with her the labs that she had done on 1/8/2021. When we look at the iron panel and ferritin we can see that she is no longer iron deficient. Blood counts on 1/8/2021 showed a hemoglobin of 9.1, a slightly elevated WBC count due to neutrophilia at 18.5. Platelets are normal at 240,000. Her reticulocyte count was slightly elevated. Differential did not show any abnormal or dysplastic cells.    The peripheral " blood smear showed a mild neutrophilia and a marked microcytic hypochromic anemia with a normal red blood cell count. The hemoglobinopathy/thalassemia cascade showed that her hemoglobin A2 was elevated to 6.1%, hemoglobin was normal at 2%. Taking all this into consideration, what she has is a beta thalassemia trait. I think we have the more accurate result in this test because the test was done when she has become iron replete.    2. I told her that for the time being she should continue taking the prenatal vitamin tablet daily and the additional iron tablet daily. This will make sure that she has enough iron and folic acid when she goes through with the pregnancy. I told her that she can continue with the same indefinitely. I really do not think that she is likely to become iron overloaded when she is in her reproductive years.    3. I discussed with her that I think the pregnancy will proceed normally. Hemoglobin is adequate for that. After delivery, I think in a few months her hemoglobin will improve to some extent. I expect that it will settle down at around 10. We can check that a few months out.    4. The main question at this point is the specific gene mutation that is causing the beta thalassemia minor and the possibility that it may be inherited by her child. She states that her  has not yet gotten her tested for his hemoglobin. I encouraged doing that if possible. I discussed with her about meeting with the genetic counselor to consider for the globin gene testing. She was open to that. I have placed a referral and I am asking for an appointment to be made for her. If her particular gene mutation is found, then that can be tested for in her child.    5. She agreed to have another CBC differential platelets and an E visit with me done in July to see how her blood counts are after pregnancy.    Time spend >30 minutes total time for the patient.       Problem List    1. Beta thalassemia minor   Ambulatory referral to Genetics    HM1(CBC and Differential)   2. Microcytic anemia     3. Pregnancy  Ambulatory referral to Genetics        CC: Betty Bush MD    ______________________________________________________________________________    History of Present Illness    Ms. Caroline Rowe was scheduled for a video visit with me to discuss the lab results from 1/8/2021. Unfortunately the video did not work on [employer] or [employer]. So the encounter was done as a telephone visit.    She says that she is taking the prenatal vitamin and the iron tablets daily without any problems. Overall she feels about the same. She does have some fatigue from the pregnancy. The pregnancy appears to be proceeding without problems. The mass in the right breast has remained the same. No pain.    Please see below. A 14 point review of system is otherwise completely negative.    Pain Status  Currently in Pain: No/denies    Review of Systems    Constitutional  Fatigue: None  Fever: None  Chills: None  Weight Gain: None  Weight Loss: None  Neurosensory  Peripheral Motor Neuropathy: None  Ataxia: None  Peripheral Sensory Neuropathy: None  Confusion: None  Syncope: None  Cardiovascular  Cardiovascular (WDL): All cardiovascular elements are within defined limits  Palpitations: None  Edema: No  Phlebitis: Present  Superficial thrombophlebitis: Present  Pulmonary  Respiratory (WDL): Within Defined Limits  Gastrointestinal  Anorexia: None  Constipation: None  Diarrhea: None  Dysphagia: None  Esophagitis: Asymptomatic, clinical or diagnostic observations only, intervention not indicated(tums)  Nausea: None  Pharyngitis: None  Vomiting: None  Dysgeusia: None  Dry Mouth: None  Genitourinary  Genitourinary (WDL): All genitourinary elements are within defined limits  Integumentary  Integumentary (WDL): All integumentary elements are within defined limits  Patient Coping  Patient Coping: Accepting  Distress Assessment  Distress Assessment Score:  "5(life)  Accompanied by       Past History  Past Medical History:   Diagnosis Date     Breast mass, right 2/5/2018       No past surgical history on file.    Physical Exam    Recent Vitals 1/8/2021   Height 5' 0.5\"   Weight 135 lbs 10 oz   BSA (m2) 1.62 m2   /69   Pulse 107   Temp 98.1   Temp src 1   SpO2 97   Some recent data might be hidden     She sounded pleasant and well over the phone. Breathing normally. Speaking in full sentences.    Lab Results    Recent Results (from the past 168 hour(s))   Morphology,Smear Review (MORP)   Result Value Ref Range    Pathology, Smear Review See Separate Pathology Report (!) (none)    WBC 18.5 (H) 4.0 - 11.0 thou/uL    RBC 4.24 3.80 - 5.40 mill/uL    Hemoglobin 9.1 (L) 12.0 - 16.0 g/dL    Hematocrit 29.5 (L) 35.0 - 47.0 %    MCV 70 (L) 80 - 100 fL    MCH 21.5 (L) 27.0 - 34.0 pg    MCHC 30.8 (L) 32.0 - 36.0 g/dL    RDW 18.8 (H) 11.0 - 14.5 %    Platelets 240 140 - 440 thou/uL    MPV 10.0 8.5 - 12.5 fL   Ferritin   Result Value Ref Range    Ferritin 97 10 - 130 ng/mL   Iron and Transferrin Iron Binding Capacity   Result Value Ref Range    Iron 124 42 - 175 ug/dL    Transferrin 252 212 - 360 mg/dL    Transferrin Saturation, Calculated 39 20 - 50 %    Transferrin IBC, Calculated 315 313 - 563 ug/dL   Reticulocytes   Result Value Ref Range    Retic Absolute Count 0.188 (H) 0.010 - 0.110 mill/uL    Retic Ct Pct 4.44 (H) 0.8 - 2.7 %   Hemoglobinopathy/Thalassemia Cascade   Result Value Ref Range    Hgb A2 Quant 6.1 (H) 2.2 - 3.5 %    Hgb F Quant 2.0 0.0 - 2.0 %    Hemoglobin,ELP       Beta thalassemia trait based on atypical CBC and increased hemoglobin A2 (6.1%). Please correlate with family history.    Path ICD: D50.9     Interpreted By: Eduardo Ma MD    Manual Differential   Result Value Ref Range    Total Neutrophils % 76 (H) 50 - 70 %    Lymphocytes % 20 20 - 40 %    Monocytes % 2 2 - 10 %    Eosinophils %  0 0 - 6 %    Basophils % 0 0 - 2 %    Myelocytes % 2 (H) " <=1 %    Immature Granulocyte % - Manual 2 (H) <=0 %    Total Neutrophils Absolute 14.1 (H) 2.0 - 7.7 thou/ul    Lymphocytes Absolute 3.7 0.8 - 4.4 thou/uL    Monocytes Absolute 0.4 0.0 - 0.9 thou/uL    Eosinophils Absolute 0.0 0.0 - 0.4 thou/uL    Basophils Absolute 0.0 0.0 - 0.2 thou/uL    Myelocytes Absolute 0.4 (H) <=0.1 thou/uL    Immature Granulocyte Absolute - Manual 0.4 (H) <=0.0 thou/uL    Manual nRBC per 100 Cells 1 (H) 0-<1    Platelet Estimate Normal Normal    Tear Drop Cells 1+ (!) Negative    Basophilic Stippling 1+ (!) Negative   Peripheral Blood Smear, Path Review   Result Value Ref Range    Case Report       Peripheral Blood Morphology                       Case: [id number]                                   Authorizing Provider:  Vielka Uribe MD           Collected:           01/08/2021 1345              Ordering Location:     [facility]   Received:            01/08/2021 [facility]                                     [facility]                                                              Pathologist:           Eduardo Ma MD                                                        Specimen:    Peripheral Blood                                                                           Final Diagnosis       PERIPHERAL BLOOD SMEAR:    - NEUTROPHILIA    - MARKED MICROCYTIC HYPOCHROMIC ANEMIA WITH NORMAL RED BLOOD CELL COUNT        (HISTORY OF HIGH NORMAL IRON STORES)     - PENDING HEMOGLOBIN STUDIES    Comment       The clinical history has been reviewed. I agree with Dr. Vielka Abbasi, the features demonstrate ineffective erythropoiesis, consistent with an underlying disorder of globin synthesis, pending hemoglobin studies.     The causes of reactive neutrophilia are numerous and range from infection to metabolic defects. Bacterial infections are the predominant cause of neutrophilia; other causes include therapeutic or endogenous drugs/hormones, acute stress, acute tissue necrosis and  other infectious/inflammatory processes, including collagen vascular disorders and autoimmune disease. Absolute neutrophilia is seen in a variety of hematopoietic neoplasms, especially myeloproliferative disorders. Recommend clinical correlation; consider repeat CBC after resolution of any possible infection. If a diagnosis of CML is suspected, evaluation of the peripheral blood for the presence of the [place] chromosome and/or the BCR/ABL fusion gene is suggested.    Peripheral Smear       Red blood cells are normal in number and overall microcytic and hypochromic. Anisopoikilocytosis and polychromasia are increased, but rouleaux formation does not appear prominent.     The white blood cell count and differential appear as reported on the CBC. Leukocytes are increased in number and demonstrate an absolute neutrophilia. No blasts or dysplastic changes are identified.    Platelets are normal in number and appearance.    Charges CPT: 45567  ICD-10: D64.9        Imaging    No results found.      Signed by: Vielka Uribe MD

## 2021-06-14 NOTE — PATIENT INSTRUCTIONS - HE
Alpha Thalassemia Trait  -Have Jose F get checked for this, too  -Follow up with hematology    Shots  -everyone in your house needs flu shot and pertussis shot    Heartburn  -Take TUMS when it hurts  -Let Dr. Bush know if you need preventative medicine  Avoid these foods:   fatty, greasy, or oily foods  chocolate  tomatoes and tomato sauce  spices (like onion, pepper, garlic)  citrus like lemon, lime, orange, grapefruit  mint  Avoid these beverages:   alcohol  caffeine  juices (like orange juice, grapefruit juice, tomato juice, apple juice)  coffee  tea  pop  other carbonated beverages  Eat smaller meals.  Don't eat before bedtime.  Avoid tight fitting clothes.  Lose weight.  Avoid tobacco.

## 2021-06-15 NOTE — TELEPHONE ENCOUNTER
Please fax rx and let patient know once it's done. Rx in chart.    Diagnoses and all orders for this visit:    Encounter for lactation counseling  -     Breast pump, double electric

## 2021-06-15 NOTE — TELEPHONE ENCOUNTER
----- Message from Betty Bush MD sent at 3/8/2021  3:19 PM CST -----  Regarding: Schedule postpartum visit  Please schedule postpartum visit in six weeks.

## 2021-06-15 NOTE — TELEPHONE ENCOUNTER
Patient's baby have an appt on 04/12/2021 with Dr. Bush. Is it okay to DB mom in with baby for her postpartum? Please advise. Thank you.

## 2021-06-15 NOTE — PROGRESS NOTES
Subjective:    Caroline Rowe is a 22 y.o. female who presents for evaluation of right breast lump.  She has had a lump in the right breast for about 2 months.  She feels like sometimes it gets bigger, other times smaller.  It always stays in the same spot.  Occasionally is painful, especially with her period.  She does think mass may get bigger around the time of her period.  She has never had a lump before.  She is a non-smoker.  No family history of breast cancer.     Objective:   Allergies:  Review of patient's allergies indicates no known allergies.    Vitals:  Vitals:    02/05/18 1122   BP: 120/80   Pulse: 82   Resp: 16   Temp: 97.6  F (36.4  C)   SpO2: 99%     Body mass index is 23.05 kg/(m^2).    Vital signs reviewed.  General: Patient is alert and oriented x 3, in no apparent distress  Cardiac: regular rate and rhythm, no murmurs  Pulmonary: lungs clear to auscultation bilaterally, no crackles, rales, rhonchi, or wheezing noted  Breast exam: Left breast exam is normal, no masses, skin changes, nipple discharge, or lymphadenopathy noted.  Right breast has 5 cm oval-shaped firm mass, nonmobile, present at approximately 10:00 on the right breast.  Remainder of right breast exam is normal.  No pain with direct palpation at the mass site.    Assessment and Plan:   1.  Right breast mass, likely fibroadenoma.  I reviewed probable diagnosis with patient and her mother.  Reassurance provided.  Referral placed for ultrasound +/- diagnostic mammogram.  Follow-up when results available.    This dictation uses voice recognition software, which may contain typographical errors.

## 2021-06-15 NOTE — PROGRESS NOTES
Subjective:    No problems. Tolerating iron.    OB ROS:   Headache: None.   Vision change: None.   Abnormal vaginal discharge: None.   Bleeding: None.   Fetal movement: Normal.     Objective:  Vitals:    21 0958   BP: 105/72   Pulse: (!) 106   Resp: 16   SpO2: 99%      Prepregnancy BMI: 22.66. Total weight gain: 22 lb (9.979 kg)   Exam: see flowsheet.  No results found for this or any previous visit (from the past 24 hour(s)).   Assessment/Plan:    25 y.o.  at 36w6d.    Due to prepregnancy BMI of 22.66, weight gain of 22 lb (9.979 kg) is appropriate for prepregnancy BMI. The following recommendations were made: continue current diet and activity.    Anemia due to beta thalassemia + anemia of pregnancy, continue iron supplement. OK with blood transfusion, if needed.    GBS obtained.    Order Summary                                                      1. Supervision of other high risk pregnancies, third trimester  Urinalysis, OB Screen (ketones, glucose, protein)    Group B Strep Screen by PCR      Completed by: Betty Bush M.D., [facility]. 2021 10:05 AM.  Total time: 15 minutes. Greater than 50% spent in counseling and coordination of care regarding topics, above.

## 2021-06-15 NOTE — TELEPHONE ENCOUNTER
Reason for Call:  Other call back      Detailed comments: pt faxed paperwork for her work has it been completed yet?    Phone Number Patient can be reached at:   Cell number on file:    Telephone Information:   Mobile [phone]       Best Time: as soon as possible  Can we leave a detailed message on this number?: Yes    Call taken on 2/17/2021 at 9:08 AM by Francisca Zuniga

## 2021-06-15 NOTE — TELEPHONE ENCOUNTER
New Appointment Needed  What is the reason for the visit:    Same Date/Next Day Appt Request  What is the reason for your visit?: Routine OB 36 weeks    Provider Preference: PCP only  How soon do you need to be seen?: asap  Waitlist offered?: No  Okay to leave a detailed message:  Yes

## 2021-06-15 NOTE — PROGRESS NOTES
Discharge instructions reviewed with patient and spouse. Instructed patient to call [facility] when coming back in. Pt and spouse agreeable to plan, deny any further questions/concerns at this time.

## 2021-06-15 NOTE — PATIENT INSTRUCTIONS - HE
Patient Education   HEALTHY PREGNANCY CARE: 37 to 41 WEEKS PREGNANT    Talk with your midwife or physician about when to call with signs of labor    Regular uterine contractions that are getting closer together and/or stronger    If you think your water has broken or is leaking    Bleeding from the vagina like a period (bloody vaginal discharge is normal)    If you are not feeling your baby move    Make plans for transportation and  as needed for when you are going to the hospital.    Your midwife or physician may offer to check your cervix for changes.     Ask your health care provider about vaccinations you may need following delivery. By now, you should have received a Tdap immunization to protect against pertussis or whooping cough. Fathers and family members who will be in close contact with the baby should also receive a Tdap shot at least two weeks before the expected birth of the baby if they have not had a Td (tetanus) shot for at least two years.    If you are past your due date, discuss the next steps leading to delivery with your midwife or physician. If you don't start labor on your own by 41 or 42 weeks, your midwife or physician may recommend giving you medicines to ripen your cervix and start labor.    Preparing for your baby: Tell your midwife or physician how you plan to feed your baby (breast or bottle), who you have chosen to do pediatric care for your baby, and if you have a boy, whether you have chosen to have him circumcised. You will need a car seat correctly installed in your vehicle to bring your baby home. As you start to set up the nursery at home for your baby, make sure the crib is safe. The mattress needs to fit snugly against the edges of the crib. If you can fit a soda can between the bars, they are too far apart and can allow the baby's head to caught between them.    Learn about infant care and feeding, including information about infant CPR. We recommend that you put  your baby to sleep on his or her back to reduce the chance of Sudden Infant Death Syndrome (SIDS). To maintain a healthy environment in which your child can grow, it's best to keep your home smoke-free. By preparing ahead, your transition into parenthood will go smoothly for you and your baby.    Your midwife or physician will want to see you for a checkup 2 to 6 weeks after delivery.    If you have questions about any symptoms you are experiencing or any other concerns, call your provider or their clinic staff at [facility] at Phone: [phone]. If it's after clinic hours, physician patients should call the Care Connection at [phone] ([phone]); midwife patients should call their answering service at [phone].    How can you care for yourself at home?   You can refer to the Starting Out Right book or find it online at [email or web address] or [email or web address]/[email or web address]-out-[email or web address]-starting-[email or web address]#[email or web address]     You can sign up for a weekly parenting e-mail that gives support, tips and advice from health care professionals that starts with pregnancy and continues through the toddler years. To register, go to [email or web address] at any time during your pregnancy.    Making Early Breastfeeding or Chestfeeding Work: What's Important?  Breastfeeding/chestfeeding is important!     It helps keep babies healthy.    Parents who breastfeed/chestfeed have lower risks of breast and ovarian cancer.    It's convenient: the milk is always ready and warm, and there is nothing to mix or prepare for feeding.    Formula is harder for your baby to digest.    It helps you bond with your baby and protects against postpartum depression.  Lots of early skin-to-skin contact with your baby    Place your naked baby with baby's belly against your bare chest. Cover baby's back with a  "blanket    Start skin-to-skin right after birth, as soon as you are ready    Skin-to-skin:  ? Helps keep baby warm  ? Improves baby's oxygen and blood sugar levels  ? Helps your uterus contract and bleed less  ? Helps baby feel calm and comforted  ? Helps you feel close to baby  ? Helps get breastfeeding started. Being close makes latching on easier, and baby may move over to the nipple and latch without help  ? Baby breastfeeds better and longer when skin-to-skin  Placing baby well for good attachment to the breast or chest    Hold your baby close with baby's tummy touching your tummy.    Wait for baby to open mouth wide, then bring baby onto the breast/chest.    Baby should take a big mouthful of breast/chest, not just the nipple. This helps baby get more milk, and the suckling should feel comfortable.    When baby is latched well to the breast/chest, nipples aren't cracked or painful.  Keeping baby near (called \"rooming-in\" at the hospital)    Baby sleeps better and cries less when birth parent is near. Your room is quiet.    We will place your baby safely on their back in their bassinette. This lets you practice safe sleep for your baby while keeping them at your bedside.    Baby feeds more often, which means your milk supply increases faster, and your baby loses less weight.    Parents have an easier time getting to know and bonding with baby.    Parents feel much more confident about baby care and breastfeeding/chestfeeding.    Maternity staff can help at any time.  Feeding on cue    Feeding on cue simply means feeding whenever your baby shows signs of hunger    Crying is a late hunger sign. Feed baby whenever baby wants for as long as baby wants.    Feeding signs: mouth movements, sticking the tongue out, rooting (baby turns toward chest and may open mouth), hand-to-mouth movements    Advantages of feeding on cue:  ? Frequent breastfeeding/chestfeeding in the first weeks after birth gives you a good milk " "supply for months to come.  ? Babies settle into a relaxing feed more quickly. Babies enjoy feedings more when they don't have to cry to be fed.  ? Feeding is comfort as well as nutrition. Newborns love constant closeness and feeding and can't be held \"too much\" or \"spoiled.\"  ? Newborns need small frequent feedings in the first days of life. Just one to three teaspoons fill a new baby's stomach.  ? Responding to feeding cues helps babies gain weight.  Feeding only human milk in the first six months    Colostrum is the first milk that baby gets at birth. The amount of colostrum matches the baby's stomach, so it will not be overfilled.    The small volumes ready at birth are also easier for baby to handle.    All babies lose weight in the first few days. This is simply \"water weight.\"    Introducing food or fluids other than human milk too early can cause problems for breastfeeding/chestfeeding and for your baby's health.    Feeding only human milk maximizes the protection against disease and infections.    Your body knows how much milk to make by how often your baby feeds. If you give your baby formula, your body may not know how much milk to make.    For informational purposes only. Not to replace the advice of your health care provider. Adapted with permission from \"Getting Started with Infant Care and Breastfeeding,\" by WILLEM Mcdaniel, BOGDAN, Marianela Biggs, RN, IBCLC, Trinity Grijalva MD, BOGDAN, and Amita Corona MD, IBCLC. [place] [employer], 2017. Clinically reviewed by [facility]. [employer] 532491 - 05/19.        [facility] Breastfeeding Resources     Research shows that human milk offers the best  nutrition and protection for babies. So at [facility],  we care for families and babies in a way that  promotes, teaches and supports lactation. We  support all breastfeeding, chestfeeding and human  milk feeding families, as well as families who can t  breastfeed / chestfeed or who " choose not to do so.  A mother or caregiver s own milk is best for a baby,  but if you are unable to breastfeed / chestfeed, we  may suggest pasteurized donor human milk for your  baby while in the hospital.    Lactation support    The following [facility] locations offer  individual outpatient lactation consults. Some  locations offer phone or group lactation consults  with certified lactation consultants. Call to confirm  services and for information and appointments. You  may wish to call your insurance company first to see  if they will cover the cost of a consult.    [facility]: [phone]    [facility]: [phone]    [facility]: [phone]  Offers [place] First Days program, which includes  group lactation visits and one free information session  about delivering your baby at a designated [facility] hospital and the importance and benefits  of breastfeeding and human milk. These group visits  also help patients with feeding concerns and offer  information about other postpartum topics.                For informational purposes only. Not to replace the advice of your health care provider. Copyright   2005 [facility]. All rights reserved. [employer] 092194 - REV          [facility] ([place]):  [phone]    [facility] ([place]):  [phone] or [phone]    [facility]):  [phone]    [facility] Breastfeeding Connection  ([place]): [phone]    [facility] ([place]):  [phone] or [phone]  Includes follow-up visits for caregivers of babies  discharged from the  intensive care unit  (NICU) at [facility].    [facility]):  [phone]    [facility] ([facility],  [facility], primary care clinics):  [phone]  Also offers  weight checks, feeding discussions and support with a lactation consultant as a part of free, weekly support group.    [facility]: [phone]  Also offers a free weekly support group.                                                                                                                                                                                                      (continued)   You may also call [facility] at [phone]  for information about [facility] and [facility]  locations that offer lactation support. For information  about breastfeeding / chestfeeding and childbirth  classes in the [facility], go to [facility] at www.[employer] (FIG)[facility] HospitalDPSI. For [facility], go to www.[email or web address] and click on  Classes and Events at the bottom of the page. Or, call  [phone].    Supplies    You can get breast pumps from the Birthplace nurses  at [facility] or [facility]  nurses at [facility]. Call your health  insurance to see if they will cover the cost of the  pump. Tell your nurse you d like a pump. They will  help you fill out the right paperwork. The pump will  be ready for you when you leave the hospital.    If you decide to get a pump after you leave the  hospital, you can get one from our partners at  [facility]. [facility] carries a range of  feeding supplies and pumps. Please call your health  insurance to see if they will cover the cost of the  pump. Then call [facility]  to find out what supplies and pumps are available.  Some stores may deliver the pump to your home.    [facility]:  [email or web address] Other lactation services    [facility]: [phone] (24 hours a day)  www.[email or web address]  Offers support for breastfeeding / chestfeeding and  human milk feeding families. Call to find a group in  your area.    National Women s Health  Information Center:  [phone]  www.womenshealth.gov/breastfeeding  Offers a breastfeeding / chestfeeding information  line in English and [place]ik.    WIC (Women, Infants and Children) Program:  [phone]  Offers breastfeeding / chestfeeding counseling. Call  to find an office near you.    Milk banking    [facility]  [email or web address]  [phone]  Consider freezing your extra collected milk to donate  to babies in need. Email or call for information.                       If you are deaf or hard of hearing, please let us know. We provide many free services including  sign language interpreters, oral interpreters, TTYs, telephone amplifiers, note takers and written materials.

## 2021-06-15 NOTE — PROGRESS NOTES
Pt to [facility] for rule out rupture. Pt states she has noticed an increase in discharge for the past couple of days but since last night and early this morning she has noticed a significant increase. Pt states when she was in the bathroom this morning she noticed some pink tinged discharge/blood when she wiped. Dr Bush called and updated on pt arrival. Plan to [facility] and [facility] to make plan of care.

## 2021-06-15 NOTE — TELEPHONE ENCOUNTER
I'm not in the office today. It would be helpful if you can fax me the items that are in my basket, so I can complete them. My doximity fax is: [phone]. I don't recall seeing Caroline's papers in the in basket, so when you're faxing, could you please keep an eye out for them and if they're not there, track them down?

## 2021-06-15 NOTE — PROGRESS NOTES
Caroline Rowe is a 25 y.o. female who is being evaluated via a billable video visit.      How would you like to obtain your AVS? Mail a copy.  If dropped from the video visit, the video invitation should be resent by: Text to cell phone: [phone]   Will anyone else be joining your video visit? No      ____________________________    Virtual Visit - Telephone Encounter  [facility]  Family Medicine  Date of Service: 2021    Subjective:    Not having more abdominal pain.  Some mild contractions.    OB ROS:   Headache: None.   Vision change: None.   Abnormal vaginal discharge: None.   Bleeding: None.   Fetal movement: Normal.     Objective:  There were no vitals filed for this visit.   Prepregnancy BMI: 22.66. Total weight gain: 21 lb (9.526 kg)   GENERAL: Healthy, alert and no distress  EYES: Eyes grossly normal to inspection. No discharge or erythema, or obvious scleral/conjunctival abnormalities.  RESP: No audible wheeze, cough, or visible cyanosis.  No visible retractions or increased work of breathing.    NEURO: Cranial nerves grossly intact. Mentation and speech appropriate for age.  PSYCH: Mentation appears normal, affect normal/bright, judgement and insight intact, normal speech and appearance well-groomed      Assessment/Plan:    25 y.o.  at 34w5d.     contractions, without labor - stable.    Order Summary                                                      No diagnosis found.  Future Appointments   Date Time Provider Department Center   2021  7:00 AM [facility]       Completed by: Betty Bush M.D., [facility]. 2021 5:12 PM.    ____________________________  Start visit:6:01 PM  End visit: 6:13 PM     Telephone-Visit Details    Type of service:  Telephone visit

## 2021-06-15 NOTE — PROGRESS NOTES
Subjective:    No problems. Having more [place] Contractions.   Has car seat and crib. Needs infant thermometer.  Rx given for breast pump today.    OB ROS:   Headache: None.   Vision change: None.   Abnormal vaginal discharge: None.   Bleeding: None.   Fetal movement: Normal.     Objective:  Vitals:    21 1653   BP: 108/73   Pulse: 89   Resp: 16      Prepregnancy BMI: 22.66. Total weight gain: 28 lb (12.7 kg)   Exam: see flowsheet.  No results found for this or any previous visit (from the past 24 hour(s)).   Assessment/Plan:    25 y.o.  at 38w3d.    Due to prepregnancy BMI of 22.66, weight gain of 28 lb (12.7 kg) is appropriate for prepregnancy BMI. The following recommendations were made: continue current diet and activity.  Future Appointments   Date Time Provider Department Center   3/9/2021  5:00 PM Betty Bush MD [facility]   3/16/2021  1:40 PM Betty Bush MD [facility]      Order Summary                                                      1. Supervision of normal first pregnancy in third trimester  Urinalysis, OB Screen (ketones, glucose, protein)      Completed by: Betty Bush M.D., [facility]. 3/2/2021 5:11 PM.  Total time: 15 minutes. Greater than 50% spent in counseling and coordination of care regarding topics, above.

## 2021-06-15 NOTE — PATIENT INSTRUCTIONS - HE
Patient Education   HEALTHY PREGNANCY CARE: 34-36 WEEKS PREGNANT    Your baby is gaining about an ounce each day, so healthy nutrition and rest are still very important. Learn about late pregnancy symptoms, such as constipation and backaches. Drinking more fluids and eating more fiber can relieve constipation. The pelvic tilt and a heating pad can ease backaches.    Continue to watch for signs and symptoms of preeclampsia:     Sudden swelling of your face, hands, or feet     New vision problems such as blurring, double vision, or flashing lights    A severe headache not relieved with acetaminophen (Tylenol)    Sharp or stabbing pain in your right or middle upper abdomen    You're almost done with your pregnancy but it's still too soon to have your baby. The goal is to have your baby after 37 weeks, so watch for signs and symptoms of premature labor:     Regular contractions. This means having about 6 or more within 1 hour, even after you have had a glass of water and are resting.     A backache that starts and stops regularly.    An increase or change in vaginal discharge, such as heavy, mucus-like, watery, or bloody discharge.     Your water breaks or leaks.    You will be tested for group B streptococcus (GBS), a type of bacteria found in 10-30% of pregnant women. A woman with GBS can pass it to her baby during delivery. Most babies who get GBS from their mothers do not have any problems, but some babies get very ill and need to be hospitalized for antibiotic treatment. Treating you with antibiotics during labor and delivery helps to prevent infection in your baby.    Review information on postpartum care that you will need after the baby is born.  Discuss your choices and plans for birth control with your midwife or physician.     Postpartum Care  During the days and weeks after the delivery of your baby (postpartum period), your body will change as it returns to its nonpregnant condition. As with pregnancy  "changes, postpartum changes are different for every woman.    Physical changes after childbirth  The changes in your body may include:    Contractions called afterpains shrink the uterus for several days after childbirth. Shrinking of the uterus to its prepregnancy size may take 6 to 8 weeks.    Sore muscles (especially in the arms, neck, or jaw) are common after childbirth. This is because of the hard work of labor and pushing your baby out. The soreness should go away in a few days.    Bleeding and vaginal discharge (lochia) may last for 2 to 8 weeks, and can come and go for about 2 months.    Vaginal soreness, including pain, discomfort, and numbness, is common after vaginal birth. Soreness may be worse if you had a perineal tear or episiotomy.    If you had a  birth (), you may have pain in your lower abdomen and may need pain medicine for 1 to 2 weeks.    Breast engorgement is common around the 3rd or 4th day after delivery, when the breasts begin to fill with milk. This can cause discomfort and swelling. If you are breast feeding, the best treatment is to feed your baby often or pump the milk out. You can also use warm compresses. If you are not breast feeding, placing ice packs on your breasts, taking a hot shower, or using warm compresses may relieve the discomfort.    Be aware of postpartum depression    \"Baby blues\" are common for the first 1 to 2 weeks after birth. You may cry or feel sad or irritable for no reason.     For some women, these feelings last longer and are more intense. This is called postpartum depression.     If your symptoms last for more than a few weeks or you feel very depressed, ask your midwife or physician for help.     Postpartum depression can be treated. Support groups and counseling can help, but sometimes medication is needed.     Discuss follow-up appointments with your midwife or physician, as well. He or she will usually want to see you 6 weeks after the " birth of your baby, sooner if you are having problems.    If you have questions about any symptoms you are experiencing or any other concerns, call your provider or their clinic staff at [facility] at Phone: [phone]. If it's after clinic hours, physician patients should call the [facility] at [phone] ([phone]); midwife patients should call their answering service at [phone].    How can you care for yourself at home?   You can refer to the Starting Out Right book or find it online at [email or web address]/images/stories/http://www.[email or web address]/images/stories/[email or web address]-[email or web address]-Right.pdf or http://www.[email or web address]/images/[email or web address]/flipbooks/[facility]-starting-out-right/[facility]-starting-out-right.html#p=8     You can sign up for a weekly parenting e-mail that gives support, tips and advice from health care professionals that starts with pregnancy and continues through the toddler years. To register, go to [email or web address] at any time during your pregnancy.    Making Early Breastfeeding or Chestfeeding Work: What's Important?  Breastfeeding/chestfeeding is important!     It helps keep babies healthy.    Parents who breastfeed/chestfeed have lower risks of breast and ovarian cancer.    It's convenient: the milk is always ready and warm, and there is nothing to mix or prepare for feeding.    Formula is harder for your baby to digest.    It helps you bond with your baby and protects against postpartum depression.  Lots of early skin-to-skin contact with your baby    Place your naked baby with baby's belly against your bare chest. Cover baby's back with a blanket    Start skin-to-skin right after birth, as soon as you are ready    Skin-to-skin:  ? Helps keep baby warm  ? Improves baby's oxygen and blood sugar levels  ? Helps your uterus contract and bleed less  ? Helps baby feel calm and comforted  ? Helps you feel close to baby  ? Helps get  "breastfeeding started. Being close makes latching on easier, and baby may move over to the nipple and latch without help  ? Baby breastfeeds better and longer when skin-to-skin  Placing baby well for good attachment to the breast or chest    Hold your baby close with baby's tummy touching your tummy.    Wait for baby to open mouth wide, then bring baby onto the breast/chest.    Baby should take a big mouthful of breast/chest, not just the nipple. This helps baby get more milk, and the suckling should feel comfortable.    When baby is latched well to the breast/chest, nipples aren't cracked or painful.  Keeping baby near (called \"rooming-in\" at the hospital)    Baby sleeps better and cries less when birth parent is near. Your room is quiet.    We will place your baby safely on their back in their bassinette. This lets you practice safe sleep for your baby while keeping them at your bedside.    Baby feeds more often, which means your milk supply increases faster, and your baby loses less weight.    Parents have an easier time getting to know and bonding with baby.    Parents feel much more confident about baby care and breastfeeding/chestfeeding.    Maternity staff can help at any time.  Feeding on cue    Feeding on cue simply means feeding whenever your baby shows signs of hunger    Crying is a late hunger sign. Feed baby whenever baby wants for as long as baby wants.    Feeding signs: mouth movements, sticking the tongue out, rooting (baby turns toward chest and may open mouth), hand-to-mouth movements    Advantages of feeding on cue:  ? Frequent breastfeeding/chestfeeding in the first weeks after birth gives you a good milk supply for months to come.  ? Babies settle into a relaxing feed more quickly. Babies enjoy feedings more when they don't have to cry to be fed.  ? Feeding is comfort as well as nutrition. Newborns love constant closeness and feeding and can't be held \"too much\" or \"spoiled.\"  ? Newborns need " "small frequent feedings in the first days of life. Just one to three teaspoons fill a new baby's stomach.  ? Responding to feeding cues helps babies gain weight.  Feeding only human milk in the first six months    Colostrum is the first milk that baby gets at birth. The amount of colostrum matches the baby's stomach, so it will not be overfilled.    The small volumes ready at birth are also easier for baby to handle.    All babies lose weight in the first few days. This is simply \"water weight.\"    Introducing food or fluids other than human milk too early can cause problems for breastfeeding/chestfeeding and for your baby's health.    Feeding only human milk maximizes the protection against disease and infections.    Your body knows how much milk to make by how often your baby feeds. If you give your baby formula, your body may not know how much milk to make.    For informational purposes only. Not to replace the advice of your health care provider. Adapted with permission from \"Getting Started with Infant Care and Breastfeeding,\" by WILLEM Mcdaniel, BOGDAN, Marianela Biggs, RN, IBCLC, Trinity Grijalva MD, BOGDAN, and Amita Corona MD, IBCLC. [place] [employer], 2017. Clinically reviewed by [facility]. [employer] 633853 - 05/19.        [facility] Breastfeeding Resources       Research shows that human milk offers the best  nutrition and protection for babies. So at [facility],  we care for families and babies in a way that  promotes, teaches and supports lactation. We  support all breastfeeding, chestfeeding and human  milk feeding families, as well as families who can t  breastfeed / chestfeed or who choose not to do so.  A mother or caregiver s own milk is best for a baby,  but if you are unable to breastfeed / chestfeed, we  may suggest pasteurized donor human milk for your  baby while in the hospital.    Lactation support    The following [facility]-affiliated locations offer  individual " outpatient lactation consults. Some  locations offer phone or group lactation consults  with certified lactation consultants. Call to confirm  services and for information and appointments. You  may wish to call your insurance company first to see  if they will cover the cost of a consult.    [facility]: [phone]    [facility]: [phone]    [facility]: [phone]  Offers [facility] First Days program, which includes  group lactation visits and one free information session  about delivering your baby at a designated [facility] hospital and the importance and benefits  of breastfeeding and human milk. These group visits  also help patients with feeding concerns and offer  information about other postpartum topics.                For informational purposes only. Not to replace the advice of your health care  provider. Copyright   2005 [facility]. All rights reserved. [employer] 112603 - REV .   [facility] ([place]):  [phone]    [facility]):  [phone] or [phone]    [facility]):  [phone]    [facility]  ([place]): [phone]    [facility] ([place]):  [phone] or [phone]  Includes follow-up visits for caregivers of babies  discharged from the  intensive care unit  (NICU) at [facility].    [facility]):  [phone]    [facility] ([facility],  [facility], primary care clinics):  [phone]  Also offers weight checks, feeding discussions and support with a lactation consultant as a part of free, weekly support group.    [facility]: [phone]  Also offers a free weekly support group.                                                                                                                                                                                                       (continued)   You may also call [facility] at [phone]  for information about [place] and [facility]  locations that offer lactation support. For information  about breastfeeding / chestfeeding and childbirth  classes in the [facility], go to [facility] at www.[employer] For [facility], go to www.[employer].org and click on  Classes and Events at the bottom of the page. Or, call  [phone].    Supplies    You can get breast pumps from the Birthplace nurses  at [facility] or [facility]  nurses at [facility]. Call your health  insurance to see if they will cover the cost of the  pump. Tell your nurse you d like a pump. They will  help you fill out the right paperwork. The pump will  be ready for you when you leave the hospital.    If you decide to get a pump after you leave the  hospital, you can get one from our partners at  [facility]. [facility] carries a range of  feeding supplies and pumps. Please call your health  insurance to see if they will cover the cost of the  pump. Then call [facility]  to find out what supplies and pumps are available.  Some stores may deliver the pump to your home.    [facility]:  [email or web address] Other lactation services    [facility]: [phone] (24 hours a day)  www.[email or web address]  Offers support for breastfeeding / chestfeeding and  human milk feeding families. Call to find a group in  your area.    National [facility]:  [phone]  www.womenshealth.gov/breastfeeding  Offers a breastfeeding / chestfeeding information  line in English and [place].    WIC (Women, Infants and Children) Program:  [phone]  Offers breastfeeding / chestfeeding counseling. Call  to find an office near  you.    Milk banking    [facility]  [email or web address]  [phone]  Consider freezing your extra collected milk to donate  to babies in need. Email or call for information.                           If you are deaf or hard of hearing, please let us know. We provide many free services including  sign language interpreters, oral interpreters, TTYs, telephone amplifiers, note takers and written materials.

## 2021-06-15 NOTE — PROGRESS NOTES
Subjective:    No problems. Some contractions. Leaking mucus plug.    OB ROS:   Headache: None.   Vision change: None.   Abnormal vaginal discharge: None.   Bleeding: None.   Fetal movement: Normal.     Objective:  Vitals:    21 1142   BP: 116/74   Pulse: (!) 102   Resp: 16   SpO2: 99%      Prepregnancy BMI: 22.66. Total weight gain: 24 lb (10.9 kg)   Exam: see flowsheet.  No results found for this or any previous visit (from the past 24 hour(s)).   Assessment/Plan:    25 y.o.  at 37w6d.    Due to prepregnancy BMI of 22.66, weight gain of 24 lb (10.9 kg) is appropriate for prepregnancy BMI. The following recommendations were made: continue current diet and activity.  Future Appointments   Date Time Provider Department Center   3/2/2021  5:00 PM Betty Bush MD [facility]   3/9/2021  5:00 PM Betty Bush MD [facility]   3/16/2021  1:40 PM Betty Bush MD [facility]      Order Summary                                                      1. Supervision of other high risk pregnancies, third trimester  Urinalysis, OB Screen (ketones, glucose, protein)   2. Pregnancy        Completed by: Betty Bush M.D., [facility]. 2021 12:04 PM.  Total time: 15 minutes. Greater than 50% spent in counseling and coordination of care regarding topics, above.

## 2021-06-15 NOTE — PROGRESS NOTES
Dr Bush updated on ROM positive result, SVE, contractions tracing every 2-5 but patient states only feels mild cramping. Appears comfortable in room and able to talk through contractions.  [facility] Pt requesting to be discharged home to gather belongings and have lunch before continuing with ripening/induction. Dr Bush updated on pt request. Plan to discharge patient home with plan to return to hospital no later than 1500 today.

## 2021-06-15 NOTE — PATIENT INSTRUCTIONS - HE
Patient Education   HEALTHY PREGNANCY CARE: 37 to 41 WEEKS PREGNANT    Talk with your midwife or physician about when to call with signs of labor    Regular uterine contractions that are getting closer together and/or stronger    If you think your water has broken or is leaking    Bleeding from the vagina like a period (bloody vaginal discharge is normal)    If you are not feeling your baby move    Make plans for transportation and  as needed for when you are going to the hospital.    Your midwife or physician may offer to check your cervix for changes.     Ask your health care provider about vaccinations you may need following delivery. By now, you should have received a Tdap immunization to protect against pertussis or whooping cough. Fathers and family members who will be in close contact with the baby should also receive a Tdap shot at least two weeks before the expected birth of the baby if they have not had a Td (tetanus) shot for at least two years.    If you are past your due date, discuss the next steps leading to delivery with your midwife or physician. If you don't start labor on your own by 41 or 42 weeks, your midwife or physician may recommend giving you medicines to ripen your cervix and start labor.    Preparing for your baby: Tell your midwife or physician how you plan to feed your baby (breast or bottle), who you have chosen to do pediatric care for your baby, and if you have a boy, whether you have chosen to have him circumcised. You will need a car seat correctly installed in your vehicle to bring your baby home. As you start to set up the nursery at home for your baby, make sure the crib is safe. The mattress needs to fit snugly against the edges of the crib. If you can fit a soda can between the bars, they are too far apart and can allow the baby's head to caught between them.    Learn about infant care and feeding, including information about infant CPR. We recommend that you put  your baby to sleep on his or her back to reduce the chance of Sudden Infant Death Syndrome (SIDS). To maintain a healthy environment in which your child can grow, it's best to keep your home smoke-free. By preparing ahead, your transition into parenthood will go smoothly for you and your baby.    Your midwife or physician will want to see you for a checkup 2 to 6 weeks after delivery.    If you have questions about any symptoms you are experiencing or any other concerns, call your provider or their clinic staff at [facility] at Phone: [phone]. If it's after clinic hours, physician patients should call the [facility] at [phone] ([phone]); midwife patients should call their answering service at [phone].    How can you care for yourself at home?   You can refer to the Starting Out Right book or find it online at [email or web address]/[email or web address]-Out-[email or web address] or [email or web address]/images/stories/[email or web address]-starting-out-[email or web address]-starting-out-right.html#p=8     You can sign up for a weekly parenting e-mail that gives support, tips and advice from health care professionals that starts with pregnancy and continues through the toddler years. To register, go to [email or web address] at any time during your pregnancy.    Making Early Breastfeeding or Chestfeeding Work: What's Important?  Breastfeeding/chestfeeding is important!     It helps keep babies healthy.    Parents who breastfeed/chestfeed have lower risks of breast and ovarian cancer.    It's convenient: the milk is always ready and warm, and there is nothing to mix or prepare for feeding.    Formula is harder for your baby to digest.    It helps you bond with your baby and protects against postpartum depression.  Lots of early skin-to-skin contact with your baby    Place your naked baby with baby's belly against your bare chest. Cover baby's back with a  "blanket    Start skin-to-skin right after birth, as soon as you are ready    Skin-to-skin:  ? Helps keep baby warm  ? Improves baby's oxygen and blood sugar levels  ? Helps your uterus contract and bleed less  ? Helps baby feel calm and comforted  ? Helps you feel close to baby  ? Helps get breastfeeding started. Being close makes latching on easier, and baby may move over to the nipple and latch without help  ? Baby breastfeeds better and longer when skin-to-skin  Placing baby well for good attachment to the breast or chest    Hold your baby close with baby's tummy touching your tummy.    Wait for baby to open mouth wide, then bring baby onto the breast/chest.    Baby should take a big mouthful of breast/chest, not just the nipple. This helps baby get more milk, and the suckling should feel comfortable.    When baby is latched well to the breast/chest, nipples aren't cracked or painful.  Keeping baby near (called \"rooming-in\" at the hospital)    Baby sleeps better and cries less when birth parent is near. Your room is quiet.    We will place your baby safely on their back in their bassinette. This lets you practice safe sleep for your baby while keeping them at your bedside.    Baby feeds more often, which means your milk supply increases faster, and your baby loses less weight.    Parents have an easier time getting to know and bonding with baby.    Parents feel much more confident about baby care and breastfeeding/chestfeeding.    Maternity staff can help at any time.  Feeding on cue    Feeding on cue simply means feeding whenever your baby shows signs of hunger    Crying is a late hunger sign. Feed baby whenever baby wants for as long as baby wants.    Feeding signs: mouth movements, sticking the tongue out, rooting (baby turns toward chest and may open mouth), hand-to-mouth movements    Advantages of feeding on cue:  ? Frequent breastfeeding/chestfeeding in the first weeks after birth gives you a good milk " "supply for months to come.  ? Babies settle into a relaxing feed more quickly. Babies enjoy feedings more when they don't have to cry to be fed.  ? Feeding is comfort as well as nutrition. Newborns love constant closeness and feeding and can't be held \"too much\" or \"spoiled.\"  ? Newborns need small frequent feedings in the first days of life. Just one to three teaspoons fill a new baby's stomach.  ? Responding to feeding cues helps babies gain weight.  Feeding only human milk in the first six months    Colostrum is the first milk that baby gets at birth. The amount of colostrum matches the baby's stomach, so it will not be overfilled.    The small volumes ready at birth are also easier for baby to handle.    All babies lose weight in the first few days. This is simply \"water weight.\"    Introducing food or fluids other than human milk too early can cause problems for breastfeeding/chestfeeding and for your baby's health.    Feeding only human milk maximizes the protection against disease and infections.    Your body knows how much milk to make by how often your baby feeds. If you give your baby formula, your body may not know how much milk to make.    For informational purposes only. Not to replace the advice of your health care provider. Adapted with permission from \"Getting Started with Infant Care and Breastfeeding,\" by WILLEM Mcdaniel, BOGDAN, Marianela Biggs, RN, IBCLC, Trinity Grijalva MD, BOGDAN, and Amita Corona MD, IBCLC. [place] [employer], 2017. Clinically reviewed by [facility]. [employer] 161805 - 05/19.        [facility] Breastfeeding Resources     Research shows that human milk offers the best  nutrition and protection for babies. So at [facility],  we care for families and babies in a way that  promotes, teaches and supports lactation. We  support all breastfeeding, chestfeeding and human  milk feeding families, as well as families who can t  breastfeed / chestfeed or who " choose not to do so.  A mother or caregiver s own milk is best for a baby,  but if you are unable to breastfeed / chestfeed, we  may suggest pasteurized donor human milk for your  baby while in the hospital.    Lactation support    The following [facility] locations offer  individual outpatient lactation consults. Some  locations offer phone or group lactation consults  with certified lactation consultants. Call to confirm  services and for information and appointments. You  may wish to call your insurance company first to see  if they will cover the cost of a consult.    [facility]: [phone]    [facility]: [phone]    [facility]: [phone]  Offers [facility] First Days program, which includes  group lactation visits and one free information session  about delivering your baby at a designated [facility] hospital and the importance and benefits  of breastfeeding and human milk. These group visits  also help patients with feeding concerns and offer  information about other postpartum topics.                For informational purposes only. Not to replace the advice of your health care provider. Copyright   2005 [facility]. All rights reserved. SM[employer] 545790 - REV          [facility] ([place]):  [phone]    [facility] ([place]):  [phone] or [phone]    [facility]):  [phone]    [facility] Breast[facility]  ([place]): [phone]    [facility] ([place]):  [phone] or [phone]  Includes follow-up visits for caregivers of babies  discharged from the  intensive care unit  (NICU) at [facility].    [facility]):  [phone]    [facility] ([facility],  [facility], primary care clinics):  [phone]  Also offers  weight checks, feeding discussions and support with a lactation consultant as a part of free, weekly support group.    [facility]: [phone]  Also offers a free weekly support group.                                                                                                                                                                                                      (continued)   You may also call [facility] at [phone]  for information about [facility] and [facility]  locations that offer lactation support. For information  about breastfeeding / chestfeeding and childbirth  classes in the [facility], go to [facility] at [email or web address]. For [facility], go to [email or web address] and click on  Classes and Events at the bottom of the page. Or, call  [phone].    Supplies    You can get breast pumps from the Birth[facility] nurses  at [facility] or [facility]  nurses at [facility]. Call your health  insurance to see if they will cover the cost of the  pump. Tell your nurse you d like a pump. They will  help you fill out the right paperwork. The pump will  be ready for you when you leave the hospital.    If you decide to get a pump after you leave the  hospital, you can get one from our partners at  [facility]. [facility] carries a range of  feeding supplies and pumps. Please call your health  insurance to see if they will cover the cost of the  pump. Then call [facility]  to find out what supplies and pumps are available.  Some stores may deliver the pump to your home.    [facility]:  [email or web address] Forumsâ„¢ Network Other lactation services    [facility]: [phone] (24 hours a day)  www.londas.org  Offers support for breastfeeding / chestfeeding and  human milk feeding families. Call to find a group in  your area.    National Women s Health  Information Center:  [phone]  www.womenshealth.gov/breastfeeding  Offers a breastfeeding / chestfeeding information  line in English and Latvian.    WIC (Women, Infants and Children) Program:  [phone]  Offers breastfeeding / chestfeeding counseling. Call  to find an office near you.    Milk banking    [facility]  [email or web address]  [phone]  Consider freezing your extra collected milk to donate  to babies in need. Email or call for information.                       If you are deaf or hard of hearing, please let us know. We provide many free services including  sign language interpreters, oral interpreters, TTYs, telephone amplifiers, note takers and written materials.

## 2021-06-16 PROBLEM — D56.3 BETA THALASSEMIA MINOR: Status: ACTIVE | Noted: 2020-10-27

## 2021-06-16 PROBLEM — D64.9 ANEMIA: Status: ACTIVE | Noted: 2020-12-01

## 2021-06-16 PROBLEM — N63.0 LUMP OR MASS IN BREAST: Status: ACTIVE | Noted: 2018-02-05

## 2021-06-16 PROBLEM — A63.0 GENITAL WARTS: Status: ACTIVE | Noted: 2020-10-27

## 2021-06-16 NOTE — PROGRESS NOTES
Caroline Rowe is a 25 y.o. female who is being evaluated via a billable telephone visit.      What phone number would you like to be contacted at? [phone]   How would you like to obtain your AVS? AVS Preference: [place].  ____________________________    Virtual Visit - Telephone Encounter  [facility]  Family Medicine  Date of Service: 4/9/2021    Subjective:    Right Hip Pain  When walking or lifting right leg  Pain over SI joint  Feels like it's the bone    Location: Right SI joint  Onset: when walking 1-2 weeks ago  Number of episodes: first time  Any history of injury: none  Radiation: No knee or back problems  Other areas affected: none    Work wants her to go back to work. 4/19/21    Objective:  Last menstrual period 06/06/2020, currently breastfeeding.   Speech is normal  Patient is calm    Assessment & Plan:  1. Sacroiliac pain - postpartum. Referral to physical therapy. Treat pain with ibuprofen and heat.   2. Breast lump - not discussed today. Will send Caroline a [employer] message to see if she's willing to do the biopsy now.      Order Summary                                                      1. Sacroiliac joint pain  Ambulatory referral to Physical Therapy    ibuprofen (ADVIL,MOTRIN) 600 MG tablet      Future Appointments   Date Time Provider Department Center   4/12/2021 11:20 AM Betty Bush MD [facility] OB [facility]       Completed by: Betty Bush M.D., [facility]. 4/9/2021 4:58 PM.  This transcription uses voice recognition software, which may contain typographical errors.  ____________________________  Start call: 4:58 PM   End call: 5:09 PM     Phone call duration: 11 minutes

## 2021-06-16 NOTE — PROGRESS NOTES
"History:  This is a 22 y.o. female who I'm asked to see for evaluation of a right breast mass.  This was picked up by the patient.  This was noticed a few months ago.  The patient believes that it has been getting bigger over time.  It is painful, especially right before her period.  She denies any skin changes, nipple changes, or nipple discharge.    PAST MEDICAL HISTORY:  Denies    PAST SURGICAL HISTORY:  Denies    Medications:  None    Allergies:  No Known Allergies    Social History:   reports that she has never smoked. She has never used smokeless tobacco. She reports that she does not drink alcohol or use illicit drugs.    Family History:  She has no family history of breast cancer.    ROS:  General: No complaints or constitutional symptoms  Skin: No complaints or symptoms   Hematologic/Lymphatic: No symptoms or complaints  Psychiatric: No symptoms or complaints  Endocrine: No excessive fatigue, no hypermetabolic symptoms reported  Respiratory: No cough, shortness of breath, or wheezing  Cardiovascular: no chest pain or dyspnea on exertion  Breast: Per HPI  Gastrointestinal: No abdominal pain, nausea, diarrhea, or constipation  Musculoskeletal: No recent injuries reported  Neurological: No focal neurologic defects reported.      PHYSICAL EXAM  /78 (Patient Site: Right Arm, Patient Position: Sitting, Cuff Size: Adult Regular)  Pulse 85  Ht 5' 0.5\" (1.537 m)  Wt 122 lb 11.2 oz (55.7 kg)  LMP 02/20/2018  Breastfeeding? No  BMI 23.57 kg/m2  Body mass index is 23.57 kg/(m^2).  General: Alert, cooperative, appears stated age   Skin: Skin color, texture, turgor normal, no rashes or lesions   Lymphatic: No obvious adenopathy, no swelling   Eyes: No scleral icterus, pupils equal  HENT: No traumatic injury to the head or face, no gross abnormalities  Lungs: Normal respiratory effort, breath sounds equal bilaterally  Heart: Regular rate and rhythm  Breasts: No palpable lumps to left breast or axilla.  Right " breast 9:00 4 cm mobile and rubbery mass.  No right axillary lymphadenopathy.  Abdomen: Soft, non-distended and non-tender to palpation  Neurologic: Grossly intact    IMAGING  US BREAST LIMITED (FOCAL) RIGHT  2/12/2018 1:45 PM     INDICATION: Palpable lump  COMPARISON: None     ULTRASOUND FINDINGS: Targeted right breast ultrasound was performed at the site of palpable lump as directed by the patient. There is a corresponding 2.9 x 1.7 x 1.6 cm circumscribed oval hypoechoic mass, wider than tall with a couple lobulations at the   9:00 position, zone 3. Appearance is suggestive of a benign fibroadenoma; however, given somewhat large size and clinical history that this is enlarging surgical excision is recommended.     IMPRESSION:      ACR BI-RADS Category 4: Suspicious.      IMPRESSION:  Right breast mass, likely fibroadenoma    PLAN:   Discussed the treatment options with the patient - continued monitoring with physical exam and repeat ultrasound, or excisional biopsy.  She would like to proceed with excisional biopsy.  This is typically an outpatient procedure under local MAC anesthetic.  The risks and benefits of surgery were explained.  Also talked about expected recovery time.  Further treatment will be dependent upon the final pathology.  Sleep apnea screen score 0.  Surgery will be scheduled at her earliest convenience.      Ayala Moy, [facility]  [phone]

## 2021-06-16 NOTE — PROGRESS NOTES
Postpartum Visit  [facility]  Date of Service: 2021    Dharmesh Velazquez is a 25 y.o. yo  who presents for a postpartum check.    Current Concerns  Vaginal pain from laceration    Contraception Plans  Withdrawal - discussed efficacy and folic acid for prevention of neural tube defects.    Delivery  The patient delivered at [facility] on 3/7/21 via .   Labor was induced for PROM at term and complicated by nothing.     The baby had the following  complications:  jaundice.   Her son, Franko, is currently exclusive breast feeding.     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       Beta thalassemia minor     's status: _____(will check postpartum, due to insurance issues).       Genital warts     Breast lump - right breast     Follow up ultrasound postpartum.          Objective     Blood pressure 102/67, pulse 68, resp. rate 16, weight 130 lb (59 kg), last menstrual period 2021, SpO2 99 %, currently breastfeeding. Body mass index is 24.56 kg/m .  Prepregnancy weight: 118 lb (53.5 kg) (BMI: 22.31).  Wt Readings from Last 3 Encounters:   21 130 lb (59 kg)   21 146 lb (66.2 kg)   21 146 lb (66.2 kg)     Heart: Regular rate and rhythm, no murmurs, rubs, or gallops.  Lungs: Clear to auscultation bilaterally.  No crackles.  Abdomen: Soft, nontender, bowel sounds normal.  No significant uterine tenderness.  Genitourinary:  Vulva, vagina, and cervix are normal in appearance.  Uterus is 8 weeks in size.  No adnexal fullness.  No excessive tenderness..  Extremities: Nontender, no significant edema.    Postpartum Depression Screen EPDS Total Score: 0 (2021 11:32 AM)  .  Item 10 (suicidal thoughts): Negative.  Interpretation Guide: Possible Depression = 10 or greater.       Assessment and Plan     25 y.o.  here today for postpartum  check.  1. Postpartum evaluation of pregnancy problems: vaginal pain after 2nd degree perineal laceration. Normal exam. Discussed gradual resumption of intercourse and normal healing process.  2. Pap smear up to date.  3. Contraception Plan: Withdrawal - discussed efficacy and folic acid for prevention of neural tube defects.  4. Discussed ideal body weight. Body mass index is 24.56 kg/m .   5. Return to work  without restrictions after completion of routine maternity leave.  6. Next physical exam due in one year.    Order Summary                                                      1. Encounter for postpartum visit        Future Appointments   Date Time Provider Department Center   4/24/2021 11:40 AM [facility] COVID VACCINE INITIAL RS CS [facility]   4/30/2021  2:00 PM [facility] US5 WW US WW       Completed by: Betty Bush M.D., [facility]. 4/15/2021 11:48 AM.  This transcription uses voice recognition software, which may contain typographical errors.

## 2021-06-17 NOTE — PROGRESS NOTES
Subjective:      Caroline Rowe is a 25 y.o. female with chief complaint of breast pain.  , delivered healthy baby 2 months ago.  She has been exclusively breast-feeding.  For the past 2 weeks she has had intermittent sharp pains in both breasts.  Sometimes can move back and forth.  She has not noticed any specific lumps.  No fevers.  She is producing her normal amount of breastmilk daily.  She has had one period since delivery, sounds more like spotting, in April.  No injuries to the chest or breasts.  Pains are bothersome to patient.    Patient Active Problem List   Diagnosis     Breast lump - right breast     Beta thalassemia minor     Genital warts     Anemia       Current Outpatient Medications:      dicloxacillin (DYNAPEN) 500 MG capsule, Take 1 capsule (500 mg total) by mouth 4 (four) times a day for 7 days., Disp: 28 capsule, Rfl: 0     prenat.vits,grady,min-iron-folic Tab, Take 1 tablet by mouth daily., Disp: 100 each, Rfl: 3      Tobacco Use      Smoking status: Never Smoker      Smokeless tobacco: Never Used       Objective:     Allergies:  Patient has no known allergies.    Vitals:  Vitals:    21 1040   BP: 101/61   Pulse: 76   Temp: 97.3  F (36.3  C)     Body mass index is 23.74 kg/m .    Vital signs reviewed.  General: Patient is alert and oriented x 3, in no apparent distress  Breast exam: Bilateral breasts are healthy and normal.  No masses, skin changes, nipple discharge, or lymphadenopathy noted.  Exam consistent with normal breastfeeding mother, no concerns today for plugged duct or mastitis      Assessment and Plan:   1. Breast pain  2. Breastfeeding problem  Patient reports sharp pains intermittently in both breasts throughout the day, seems to happen more often when breast-feeding.  No exam findings or symptom report that would make me concerned for mastitis or plugged duct at this time  I called lactation line today and spoke with a nurse there who felt that lactation referral would be  a good option for patient.  Referral placed today.  I also sent a message the patient through [facility] with this information and lactation's phone number.  - Ambulatory referral to Lactation    This dictation uses voice recognition software, which may contain typographical errors.

## 2021-06-17 NOTE — TELEPHONE ENCOUNTER
Spoke with pt and offered to help pt schedule a appt per message, pt stated pt is feeling ok now and no appt is needed. Informed pt to call back if pt need to be seen. Complete.

## 2021-06-17 NOTE — TELEPHONE ENCOUNTER
Left message x 1. Please review message thread below and advise the patient as indicated. Please schedule if necessary or indicated in message thread.    ----- Message from Betty Bush MD sent at 5/4/2021  5:33 PM CDT -----  Please check in with Caroline to see if she is having any breast problems now.

## 2021-06-17 NOTE — TELEPHONE ENCOUNTER
Pt called back anfd I asked her about breast problems, she said she is still having occasional pain in both breasts

## 2021-06-17 NOTE — TELEPHONE ENCOUNTER
Discussed with patient.   Instructions given.   Prescription sent for antibiotics.   Needs to be seen Monday or Tuesday, please fit in.

## 2021-06-17 NOTE — TELEPHONE ENCOUNTER
Left message #1 at [phone]. Postponing task out to a week and will try again. If patient returns call back, please help patient schedule an appointment per message below. Thanks!

## 2021-06-18 NOTE — PATIENT INSTRUCTIONS - HE
Patient Instructions by Betty Bush MD at 4/8/2021  4:00 PM     Author: Betty Bush MD Service: -- Author Type: Physician    Filed: 4/8/2021  5:08 PM Encounter Date: 4/8/2021 Status: Signed    : Betty Bush MD (Physician)         Patient Education     Caring for Your Back Throughout the Day    Take care of your back throughout the day. You will likely have fewer back problems if you do. Try to warm up before you move. Shift positions often. Also do your best to form healthy habits.  Warm up for the day  Do a few slow, catlike stretches before starting your day. This simple warmup can soften your disks, stretch your back muscles, and help prevent injuries.  Shift positions often  At work and at home, change positions often. This helps keep your body from getting stiff. Stand up or lean back while you sit. If you can, get up and move every 1/2 hour.  Form healthy habits  Here are some suggestions:     Keep a healthy weight. When you weigh too much, your back is under excess strain. But losing just a few extra pounds can help a lot.    Try not to overeat. Learn about serving sizes. The size of a serving depends on the food and the food group. Many foods list serving sizes on the labels.    Handle minor aches with cold and heat. Apply cold the first 24 to 48 hours. Use heat after that. Always place a thin cloth between your skin and the source of cold or heat.    Take medicines as directed. This helps keep pain under control. Always read labels, and call your healthcare provider or pharmacist if you have any questions.  Walk each day  A daily walk keeps your back and thigh muscles stretched and strong. This gives your back better support. Be sure to walk with your spines three curves aligned, by keeping your head, hips, and toes connected by a vertical line.  Date Last Reviewed: 5/1/2018 2000-2019 [employer]. [address] [place] [address], [place] [address]. All rights reserved. This  information is not intended as a substitute for professional medical care. Always follow your healthcare professional's instructions.

## 2021-06-20 NOTE — LETTER
Letter by Jennifer Chavez PA-C at      Author: Jennifer Chavez PA-C Service: -- Author Type: --    Filed:  Encounter Date: 9/23/2020 Status: (Other)         09/23/20    To Whom It May Concern:    Caroline Rowe was seen today at [facility] for Pregnancy Confirmation.    She is Unknown.    Due Date: Estimated Date of Delivery: None noted.     Thank you.    Jennifer Chavez PA-C

## 2021-06-20 NOTE — LETTER
Letter by Jennifer Chavez PA-C at      Author: Jennifer Chavez PA-C Service: -- Author Type: --    Filed:  Encounter Date: 9/23/2020 Status: (Other)         September 23, 2020     Patient: Caroline Rowe   YOB: 1995   Date of Visit: 9/23/2020       To Whom it May Concern:    Caroline Rowe was seen in my clinic on 9/23/2020.  She is approximately 15 weeks pregnant.  She is cleared to have local anesthetic for dental procedure.    If you have any questions or concerns, please don't hesitate to call.    Sincerely,         Electronically signed by Jennifer Chavez PA-C

## 2021-06-21 NOTE — LETTER
Letter by Betty Bush MD at      Author: Betty Bush MD Service: -- Author Type: --    Filed:  Encounter Date: 2021 Status: (Other)         2021     Patient: Caroline Rowe   YOB: 1995   Date of Visit: 2021       To Whom It May Concern:    Caroline Rowe delivered via  on 3/7/21. She may return to work, without restrictions on 21.    If you have any questions or concerns, please don't hesitate to call.    Sincerely,        Electronically signed by Betty Bush MD

## 2021-06-21 NOTE — LETTER
Letter by Vielka Uribe MD at      Author: Vielka Uribe MD Service: -- Author Type: --    Filed:  Encounter Date: 12/14/2020 Status: (Other)       Dear Caroline Rowe    Thank you for choosing [facility] for your care.  We are committed to providing you with the highest quality and compassionate healthcare services.  The following information pertains to your first appointment with our clinic.    Date/Time of appointment:  1/8/2021 12:45pm      Note: Please arrive 30 minutes prior to your appointment time.  This allows time to complete forms, possible labs and nursing assessment.    Name of your Physician:  Vielka Uribe MD    What to bring to your appointment:    Completed Patient History/Initial Nursing Assessment and Medication/Allergy List (these forms were sent to you).    Any paperwork or films from your physician that we have asked you to bring.    Your current insurance card(s).    Parking:    Please refer to the map included to direct you to [facility].    You can park in any visitor/patient parking area you choose. There is no charge for parking at [facility].     Enter the hospital at the front/main entrance.      Please check in with our  representatives who will escort you to the clinic located in [address] of the [facility].    We hope these instructions are helpful to you.  If you have any questions or concerns, please call us at [phone].  It is our pleasure to assist you.    Warm Regards,    [phone]

## 2021-06-21 NOTE — LETTER
Letter by Vielka Uribe MD at      Author: Vielka Uribe MD Service: -- Author Type: --    Filed:  Encounter Date: 4/22/2021 Status: (Other)                   Office Hours: Monday - Friday 8:00 - 4:30PM    Caroline Rowe  [id number] [address]           April 22, 2021      Dear Caroline:    It looks as though you are due to see Dr. Uribe in July. If you would like to schedule this please call [phone]         Sincerely,        [facility]

## 2021-06-26 ENCOUNTER — AMBULATORY - HEALTHEAST (OUTPATIENT)
Dept: NURSING | Facility: CLINIC | Age: 26
End: 2021-06-26

## 2021-07-03 NOTE — ADDENDUM NOTE
Addendum Note by Betty Bush MD at 10/27/2020  5:20 PM     Author: Betty Bush MD Service: -- Author Type: Physician    Filed: 10/28/2020  1:55 PM Encounter Date: 10/27/2020 Status: Signed    : Betty Bush MD (Physician)    Addended by: BETTY BUSH on: 10/28/2020 01:55 PM        Modules accepted: Orders

## 2021-09-17 ENCOUNTER — MEDICAL CORRESPONDENCE (OUTPATIENT)
Dept: HEALTH INFORMATION MANAGEMENT | Facility: CLINIC | Age: 26
End: 2021-09-17

## 2021-10-02 ENCOUNTER — HEALTH MAINTENANCE LETTER (OUTPATIENT)
Age: 26
End: 2021-10-02

## 2021-11-27 ENCOUNTER — HEALTH MAINTENANCE LETTER (OUTPATIENT)
Age: 26
End: 2021-11-27

## 2022-05-10 ENCOUNTER — VIRTUAL VISIT (OUTPATIENT)
Dept: FAMILY MEDICINE | Facility: CLINIC | Age: 27
End: 2022-05-10
Payer: COMMERCIAL

## 2022-05-10 ENCOUNTER — TELEPHONE (OUTPATIENT)
Dept: FAMILY MEDICINE | Facility: CLINIC | Age: 27
End: 2022-05-10

## 2022-05-10 DIAGNOSIS — R21 RASH: Primary | ICD-10-CM

## 2022-05-10 PROCEDURE — 99213 OFFICE O/P EST LOW 20 MIN: CPT | Mod: GT | Performed by: FAMILY MEDICINE

## 2022-05-10 RX ORDER — PERMETHRIN 50 MG/G
CREAM TOPICAL
Qty: 60 G | Refills: 1 | Status: SHIPPED | OUTPATIENT
Start: 2022-05-10 | End: 2022-08-04

## 2022-05-10 RX ORDER — TRIAMCINOLONE ACETONIDE 1 MG/G
OINTMENT TOPICAL 2 TIMES DAILY
Qty: 80 G | Refills: 1 | Status: SHIPPED | OUTPATIENT
Start: 2022-05-10 | End: 2022-08-04

## 2022-05-10 RX ORDER — LORATADINE 10 MG/1
10 TABLET ORAL DAILY
Qty: 50 TABLET | Refills: 11 | Status: SHIPPED | OUTPATIENT
Start: 2022-05-10 | End: 2022-08-04

## 2022-05-10 NOTE — TELEPHONE ENCOUNTER
RN called patient regarding her mychart message.      Patient was out of stated 2 weeks ago. After she came back, she has rash on her body. Her rash is bumpy and red spots all over on her legs, body, arms, inner thighs, and back. Patient has been itching all over and hardly get any sleep. Patient applied hydrocortisone but did not seem to help.       Patient's spouse also sleeps on the same bed but he did not have any rash at all.       Patient has virtual visit with Dr. Bush today. RN will route this message to Dr. Bush as AMY.        Neda Diaz RN  [facility]

## 2022-05-10 NOTE — PROGRESS NOTES
Caroline is a 26 year old who is being evaluated via a billable video visit.      How would you like to obtain your AVS? [facility]  If the video visit is dropped, the invitation should be resent by: Text to cell phone: [phone]  Will anyone else be joining your video visit? No      ____________________________    Virtual Visit - Video Encounter  [facility]  Family Medicine  Date of Service: 5/10/2022    Subjective:  Chief Complaint   Patient presents with     Derm Problem      Went to [place] 2-3 weeks ago. Not doing outdoors activities.   Red bumps since then  Bed bugs?   Started upon return home.  Hard to sleep  Hydrocortisone  Feeling really tired  No joint pain  Location: torso, arms, legs, behind knees, groin, axillae    Objective:            GENERAL: Healthy, alert and no distress  [facility]  No visits with results within 1 Week(s) from this visit.   Latest known visit with results is:   Ambulatory - [facility] on 04/27/2021   Component Date Value Ref Range Status     SARS-CoV-2 Virus Specimen Source 04/27/2021 Nasopharyngeal   Final     SARS-CoV-2 PCR Result 04/27/2021 NEGATIVE   Final    SARS-CoV2 (COVID-19) RNA not detected, presumed negative.     SARS-CoV-2 PCR Comment 04/27/2021 Testing was performed using the collins SARS-CoV-2 assay on the collins 6800   Final    Comment: System.2  This test should be ordered for the detection of SARS-CoV-2 in individuals who  meet SARS-CoV-2 clinical and/or epidemiological criteria. Test performance is  unknown in asymptomatic patients.  This test is for in vitro diagnostic use under the FDA EUA for laboratories  certified under CLIA to perform high and/or moderate complexity testing. This  test has not been FDA cleared or approved.  A negative result does not rule out the presence of PCR inhibitors in the  specimen or target RNA in concentration below the limit of detection for the  assay. The possibility of a false  negative should be considered if the  patient's recent exposure or clinical presentation suggests COVID-19.  This test was validated by the [facility] Diagnostic  Laboratory. This laboratory is certified under the Clinical Laboratory  Improvement Amendments of 1988 (CLIA-88) as qualified to perform high and/or  moderate complexity laboratory testing.    Performed and/or entered b                           y:  [facility]  [address] [place] [address]       No results found.   Assessment & Plan:  1. Papular rash, widespread. Differential diagnosis includes bed bugs, scabies, bacterial infection, or urticaria. Appearance most consistent with bed bug bites. Treat empirically with permethrin cream, triamcinolone cream, loratadine. Return if symptoms worsen or fail to improve.       Order Summary                                                      Caroline was seen today for derm problem.    Diagnoses and all orders for this visit:    Rash  -     permethrin (ELIMITE) 5 % external cream; Apply cream from head to toe (except the face); leave on for 8-14 hours then wash off with water; reapply in 1 week if live mites appear.  -     loratadine (CLARITIN) 10 MG tablet; Take 1 tablet (10 mg) by mouth daily  -     triamcinolone (KENALOG) 0.1 % external ointment; Apply topically 2 times daily    Other orders  -     Human Papilloma Virus Vaccine (Gardasil 9) 3 Dose IM; Future  -     REVIEW OF HEALTH MAINTENANCE PROTOCOL ORDERS        No future appointments.    Completed by: Betty Bush M.D., [facility]. 5/10/2022 12:46 PM.  This transcription uses voice recognition software, which may contain typographical errors.  ____________________________  Start visit: 12:46 PM  End visit: 1:01 PM     Video-Visit Details    Type of service:  Video Visit    Originating Location (pt. Location): Home    Distant Location (provider location):  [facility]  CLINIC [facility]     Platform used for Video Visit: Rosy

## 2022-05-20 ENCOUNTER — TELEPHONE (OUTPATIENT)
Dept: ONCOLOGY | Facility: CLINIC | Age: 27
End: 2022-05-20
Payer: COMMERCIAL

## 2022-05-20 NOTE — TELEPHONE ENCOUNTER
----- Message from Falguni Colin sent at 5/20/2022  7:57 AM CDT -----    ----- Message -----  From: Falguni Colin  Sent: 5/12/2022  12:00 AM CDT  To: Falguni Colin      ----- Message -----  From: Aixa Sharma MA  Sent: 5/6/2022  10:03 AM CDT  To: [facility] Scheduling    Please contact patient to schedule labs and a e-visit to follow with a new provider (micki patient). Due last July  Thank you

## 2022-08-04 ENCOUNTER — PRENATAL OFFICE VISIT (OUTPATIENT)
Dept: FAMILY MEDICINE | Facility: CLINIC | Age: 27
End: 2022-08-04
Payer: COMMERCIAL

## 2022-08-04 VITALS
DIASTOLIC BLOOD PRESSURE: 71 MMHG | RESPIRATION RATE: 24 BRPM | WEIGHT: 131.25 LBS | BODY MASS INDEX: 24.78 KG/M2 | SYSTOLIC BLOOD PRESSURE: 110 MMHG | OXYGEN SATURATION: 97 % | HEART RATE: 91 BPM | TEMPERATURE: 98.1 F | HEIGHT: 61 IN

## 2022-08-04 DIAGNOSIS — N92.6 MISSED PERIOD: ICD-10-CM

## 2022-08-04 DIAGNOSIS — G89.29 CHRONIC BILATERAL LOW BACK PAIN WITHOUT SCIATICA: ICD-10-CM

## 2022-08-04 DIAGNOSIS — D50.8 IRON DEFICIENCY ANEMIA SECONDARY TO INADEQUATE DIETARY IRON INTAKE: ICD-10-CM

## 2022-08-04 DIAGNOSIS — Z34.80 SUPERVISION OF OTHER NORMAL PREGNANCY: Primary | ICD-10-CM

## 2022-08-04 DIAGNOSIS — N63.0 LUMP OR MASS IN BREAST: ICD-10-CM

## 2022-08-04 DIAGNOSIS — M54.50 CHRONIC BILATERAL LOW BACK PAIN WITHOUT SCIATICA: ICD-10-CM

## 2022-08-04 PROBLEM — A63.0 GENITAL WARTS: Status: RESOLVED | Noted: 2020-10-27 | Resolved: 2022-08-04

## 2022-08-04 LAB
ABO/RH(D): NORMAL
ALBUMIN SERPL BCG-MCNC: 4 G/DL (ref 3.5–5.2)
ALBUMIN UR-MCNC: NEGATIVE MG/DL
ALP SERPL-CCNC: 49 U/L (ref 35–104)
ALT SERPL W P-5'-P-CCNC: 14 U/L (ref 10–35)
ANION GAP SERPL CALCULATED.3IONS-SCNC: 12 MMOL/L (ref 7–15)
ANTIBODY SCREEN: NEGATIVE
APPEARANCE UR: CLEAR
AST SERPL W P-5'-P-CCNC: 25 U/L (ref 10–35)
BACTERIA #/AREA URNS HPF: ABNORMAL /HPF
BASOPHILS # BLD AUTO: 0 10E3/UL (ref 0–0.2)
BASOPHILS NFR BLD AUTO: 0 %
BILIRUB SERPL-MCNC: 0.5 MG/DL
BILIRUB UR QL STRIP: NEGATIVE
BUN SERPL-MCNC: 7.5 MG/DL (ref 6–20)
CALCIUM SERPL-MCNC: 9.3 MG/DL (ref 8.6–10)
CHLORIDE SERPL-SCNC: 103 MMOL/L (ref 98–107)
COLOR UR AUTO: YELLOW
CREAT SERPL-MCNC: 0.44 MG/DL (ref 0.51–0.95)
DEPRECATED HCO3 PLAS-SCNC: 21 MMOL/L (ref 22–29)
EOSINOPHIL # BLD AUTO: 0.2 10E3/UL (ref 0–0.7)
EOSINOPHIL NFR BLD AUTO: 1 %
ERYTHROCYTE [DISTWIDTH] IN BLOOD BY AUTOMATED COUNT: 15.9 % (ref 10–15)
GFR SERPL CREATININE-BSD FRML MDRD: >90 ML/MIN/1.73M2
GLUCOSE SERPL-MCNC: 103 MG/DL (ref 70–99)
GLUCOSE UR STRIP-MCNC: NEGATIVE MG/DL
HBV SURFACE AB SERPL IA-ACNC: 30.03 M[IU]/ML
HBV SURFACE AG SERPL QL IA: NONREACTIVE
HCG UR QL: POSITIVE
HCT VFR BLD AUTO: 32 % (ref 35–47)
HGB BLD-MCNC: 10.3 G/DL (ref 11.7–15.7)
HGB UR QL STRIP: ABNORMAL
HIV 1+2 AB+HIV1 P24 AG SERPL QL IA: NONREACTIVE
IMM GRANULOCYTES # BLD: 0 10E3/UL
IMM GRANULOCYTES NFR BLD: 0 %
IRON BINDING CAPACITY (ROCHE): 228 UG/DL (ref 240–430)
IRON SATN MFR SERPL: 50 % (ref 15–46)
IRON SERPL-MCNC: 114 UG/DL (ref 37–145)
KETONES UR STRIP-MCNC: NEGATIVE MG/DL
LEUKOCYTE ESTERASE UR QL STRIP: NEGATIVE
LYMPHOCYTES # BLD AUTO: 1.8 10E3/UL (ref 0.8–5.3)
LYMPHOCYTES NFR BLD AUTO: 17 %
MCH RBC QN AUTO: 20.3 PG (ref 26.5–33)
MCHC RBC AUTO-ENTMCNC: 32.2 G/DL (ref 31.5–36.5)
MCV RBC AUTO: 63 FL (ref 78–100)
MONOCYTES # BLD AUTO: 0.7 10E3/UL (ref 0–1.3)
MONOCYTES NFR BLD AUTO: 6 %
NEUTROPHILS # BLD AUTO: 8.4 10E3/UL (ref 1.6–8.3)
NEUTROPHILS NFR BLD AUTO: 75 %
NITRATE UR QL: NEGATIVE
PH UR STRIP: 6.5 [PH] (ref 5–8)
PLATELET # BLD AUTO: 293 10E3/UL (ref 150–450)
POTASSIUM SERPL-SCNC: 3.6 MMOL/L (ref 3.4–5.3)
PROT SERPL-MCNC: 7.5 G/DL (ref 6.4–8.3)
RBC # BLD AUTO: 5.08 10E6/UL (ref 3.8–5.2)
RBC #/AREA URNS AUTO: ABNORMAL /HPF
SODIUM SERPL-SCNC: 136 MMOL/L (ref 136–145)
SP GR UR STRIP: 1.02 (ref 1–1.03)
SPECIMEN EXPIRATION DATE: NORMAL
SQUAMOUS #/AREA URNS AUTO: ABNORMAL /LPF
T PALLIDUM AB SER QL: NONREACTIVE
UROBILINOGEN UR STRIP-ACNC: 0.2 E.U./DL
WBC # BLD AUTO: 11.2 10E3/UL (ref 4–11)
WBC #/AREA URNS AUTO: ABNORMAL /HPF

## 2022-08-04 PROCEDURE — 87086 URINE CULTURE/COLONY COUNT: CPT | Performed by: FAMILY MEDICINE

## 2022-08-04 PROCEDURE — 87340 HEPATITIS B SURFACE AG IA: CPT | Performed by: FAMILY MEDICINE

## 2022-08-04 PROCEDURE — 99000 SPECIMEN HANDLING OFFICE-LAB: CPT | Performed by: FAMILY MEDICINE

## 2022-08-04 PROCEDURE — 99207 PR PRENATAL VISIT: CPT | Performed by: FAMILY MEDICINE

## 2022-08-04 PROCEDURE — 85025 COMPLETE CBC W/AUTO DIFF WBC: CPT | Performed by: FAMILY MEDICINE

## 2022-08-04 PROCEDURE — 86900 BLOOD TYPING SEROLOGIC ABO: CPT | Performed by: FAMILY MEDICINE

## 2022-08-04 PROCEDURE — 86762 RUBELLA ANTIBODY: CPT | Performed by: FAMILY MEDICINE

## 2022-08-04 PROCEDURE — 83655 ASSAY OF LEAD: CPT | Mod: 90 | Performed by: FAMILY MEDICINE

## 2022-08-04 PROCEDURE — 86780 TREPONEMA PALLIDUM: CPT | Performed by: FAMILY MEDICINE

## 2022-08-04 PROCEDURE — 80053 COMPREHEN METABOLIC PANEL: CPT | Performed by: FAMILY MEDICINE

## 2022-08-04 PROCEDURE — 87591 N.GONORRHOEAE DNA AMP PROB: CPT | Performed by: FAMILY MEDICINE

## 2022-08-04 PROCEDURE — 86901 BLOOD TYPING SEROLOGIC RH(D): CPT | Performed by: FAMILY MEDICINE

## 2022-08-04 PROCEDURE — 87491 CHLMYD TRACH DNA AMP PROBE: CPT | Performed by: FAMILY MEDICINE

## 2022-08-04 PROCEDURE — 83550 IRON BINDING TEST: CPT | Performed by: FAMILY MEDICINE

## 2022-08-04 PROCEDURE — 81001 URINALYSIS AUTO W/SCOPE: CPT | Performed by: FAMILY MEDICINE

## 2022-08-04 PROCEDURE — 86850 RBC ANTIBODY SCREEN: CPT | Performed by: FAMILY MEDICINE

## 2022-08-04 PROCEDURE — 83540 ASSAY OF IRON: CPT | Performed by: FAMILY MEDICINE

## 2022-08-04 PROCEDURE — 81025 URINE PREGNANCY TEST: CPT | Performed by: FAMILY MEDICINE

## 2022-08-04 PROCEDURE — 36415 COLL VENOUS BLD VENIPUNCTURE: CPT | Performed by: FAMILY MEDICINE

## 2022-08-04 PROCEDURE — 87389 HIV-1 AG W/HIV-1&-2 AB AG IA: CPT | Performed by: FAMILY MEDICINE

## 2022-08-04 PROCEDURE — 86706 HEP B SURFACE ANTIBODY: CPT | Performed by: FAMILY MEDICINE

## 2022-08-04 RX ORDER — PRENATAL VIT/IRON FUM/FOLIC AC 27MG-0.8MG
1 TABLET ORAL DAILY
Qty: 90 TABLET | Refills: 3 | Status: SHIPPED | OUTPATIENT
Start: 2022-08-04 | End: 2022-12-16

## 2022-08-04 NOTE — PROGRESS NOTES
First OB Appointment    Assessment & Plan:  1. Dating: EDC is Feb 20, 2023. Dating US ordered.  2. Aspirin: Based on her risk factors, Caroline is NOT at high risk of preeclampsia. Low-dose aspirin prophylaxis is NOT recommended for prevention of preeclampsia.   3. Weight: Based on prepregnancy 26.16, recommended weight gain of 7 kg (15 lb)-11.5 kg (25 lb).  4. Diabetes risk: Based on her risk factors, Caroline is at increased risk of DM2/GDM, due to  ethnicity.   5. Trisomy screening: offered and declined.  6. Carrier screening for SMA and CF: declined.    Order Summary    ICD-10-CM    1. Supervision of other normal pregnancy  Z34.80 Lead, Venous Blood Confirmation     Chlamydia trachomatis/Neisseria gonorrhoeae by PCR     ABO/Rh type and screen     HIV Antigen Antibody Combo     Rubella Antibody IgG     Hepatitis B surface antigen     Treponema Abs w Reflex to RPR and Titer     Hepatitis B Surface Antibody     US OB < 14 Weeks Single     Prenatal Vit-Fe Fumarate-FA (PRENATAL MULTIVITAMIN W/IRON) 27-0.8 MG tablet     UA reflex to Microscopic - lab collect     Urine Culture     Comprehensive metabolic panel (BMP + Alb, Alk Phos, ALT, AST, Total. Bili, TP)     Physical Therapy Referral   2. Missed period  N92.6 HCG qualitative urine     HCG qualitative urine   3. Iron deficiency anemia secondary to inadequate dietary iron intake  D50.8 Iron and iron binding capacity   4. Breast lump - right breast  N63.0    5. Chronic bilateral low back pain without sciatica  M54.50 Physical Therapy Referral    G89.29     No future appointments.  Completed by: Betty Bush M.D., [facility]. 8/4/2022 9:27 AM.  This transcription uses voice recognition software, which may contain typographical errors.    Subjective:  This is a planned pregnancy. The patient feels good about it. Current pregnancy symptoms include: tired, a little nauseated in the morning.     Preeclampsia risk factors - at increased risk if 1+ high risk  "or 2+ moderate risk factors    High risk factors (1+):NONE    Moderate risk factors (2+): NONE     Diabetes risk factors - at increased risk if BMI 25+ and 1+ additional risk factors      Prepregnancy 26.16     Additional risk factors (1+): Ethnicity: , , ,  American,     I reviewed the following components of the patient chart today:    OB intake note    Active problems    Past Medical History    Medications    Allergies    Family History    Social History    Genetic Questionairre    Prenatal Labs    Prenatal Ultrasound    OB history  OB History    Para Term  AB Living   2 1 1 0 0 1   SAB IAB Ectopic Multiple Live Births   0 0 0 0 1      # Outcome Date GA Lbr Fernando/2nd Weight Sex Delivery Anes PTL Lv   2 Current            1 Term 21 39w1d 07:51 / 02:56 3.289 kg (7 lb 4 oz) M Vag-Spont Nitrous, IV N EMIR      Name: SERGIO NIETO-CYNTHIA      Apgar1: 5  Apgar5: 9       Objective:  /71 (BP Location: Left arm, Patient Position: Sitting, Cuff Size: Adult Small)   Pulse 91   Temp 98.1  F (36.7  C)   Resp 24   Ht 1.558 m (5' 1.34\")   Wt 59.5 kg (131 lb 4 oz)   LMP 2022 (Exact Date)   SpO2 97%   BMI 24.53 kg/m   Body mass index is 24.53 kg/m .   See prenatal flowsheet and physical exam portion of prenatal episode.    40 minutes spent on the day of encounter doing chart review, history and exam, documentation,  and review of patient's current health status and pregnancy.     "

## 2022-08-05 PROBLEM — Z28.39 RUBELLA NON-IMMUNE STATUS, ANTEPARTUM: Status: ACTIVE | Noted: 2022-08-05

## 2022-08-05 PROBLEM — O09.899 RUBELLA NON-IMMUNE STATUS, ANTEPARTUM: Status: ACTIVE | Noted: 2022-08-05

## 2022-08-05 LAB
C TRACH DNA SPEC QL PROBE+SIG AMP: NEGATIVE
N GONORRHOEA DNA SPEC QL NAA+PROBE: NEGATIVE
RUBV IGG SERPL QL IA: 0.83 INDEX
RUBV IGG SERPL QL IA: NORMAL

## 2022-08-06 LAB
BACTERIA UR CULT: NORMAL
LEAD BLDV-MCNC: <2 UG/DL

## 2022-08-15 ENCOUNTER — HOSPITAL ENCOUNTER (OUTPATIENT)
Dept: ULTRASOUND IMAGING | Facility: HOSPITAL | Age: 27
Discharge: HOME | End: 2022-08-15
Attending: FAMILY MEDICINE | Admitting: FAMILY MEDICINE
Payer: COMMERCIAL

## 2022-08-15 DIAGNOSIS — Z34.80 SUPERVISION OF OTHER NORMAL PREGNANCY: ICD-10-CM

## 2022-08-15 PROCEDURE — 76801 OB US < 14 WKS SINGLE FETUS: CPT

## 2022-09-04 ENCOUNTER — HEALTH MAINTENANCE LETTER (OUTPATIENT)
Age: 27
End: 2022-09-04

## 2022-09-09 ENCOUNTER — VIRTUAL VISIT (OUTPATIENT)
Dept: FAMILY MEDICINE | Facility: CLINIC | Age: 27
End: 2022-09-09
Payer: COMMERCIAL

## 2022-09-09 ENCOUNTER — TELEPHONE (OUTPATIENT)
Dept: FAMILY MEDICINE | Facility: CLINIC | Age: 27
End: 2022-09-09

## 2022-09-09 DIAGNOSIS — Z34.80 SUPERVISION OF OTHER NORMAL PREGNANCY: Primary | ICD-10-CM

## 2022-09-09 PROCEDURE — 99207 PR PRENATAL FLOW SHEET: CPT | Performed by: PHYSICIAN ASSISTANT

## 2022-09-09 PROCEDURE — 99213 OFFICE O/P EST LOW 20 MIN: CPT | Mod: TEL | Performed by: PHYSICIAN ASSISTANT

## 2022-09-09 NOTE — PROGRESS NOTES
-Caroline is a 26 year old who is being evaluated via a billable telephone visit.      What phone number would you like to be contacted at? [phone]  How would you like to obtain your AVS? [facility]      ASSESSMENT and PLAN:  1. Pregnancy Intake Appointment  Caroline Rowe is 26 year old and .  Patient's last menstrual period was 2022 (exact date).  Estimated Date of Delivery: 2023  She will be seeing Dr. Bush for OB care at [facility] ([facility]  Screening pregnancy lab work was drawn previously, reviewed today.  Prenatal vitamin already prescribed.  Problem list and current medications reviewed and updated.  Dating ultrasound ordered.  Potential exposures/risks discussed: work environment, raw meat/seafood/deli meat, home construction, cats, caffeine.  Follow up in 6 weeks.            HPI:  Caroline Rowe is a 26 year old female here for pregnancy intake.  She is .  Patient's last menstrual period was 2022 (exact date).  She had a positive pregnancy test with Dr. Ridley on 22.  Screening labs ordered at that visit.  Patient declined First Trimester screening at that visit.    I reviewed office note with Dr Bush from 22.      Past Medical History:   Diagnosis Date     Anemia      Breast lump - right breast 2018     Breast mass, right 2018     Genital warts 10/27/2020     Urinary tract infection         History reviewed. No pertinent surgical history.     Current Outpatient Medications   Medication     Prenatal Vit-Fe Fumarate-FA (PRENATAL MULTIVITAMIN W/IRON) 27-0.8 MG tablet     No current facility-administered medications for this visit.        No visits with results within 2 Day(s) from this visit.   Latest known visit with results is:   Prenatal Office Visit on 2022   Component Date Value Ref Range Status     Chlamydia Trachomatis 2022 Negative  Negative Final     Neisseria gonorrhoeae 2022 Negative  Negative Final     hCG Urine Qualitative 2022  Positive (A) Negative Final     Lead Venous Blood 08/04/2022 <2.0  <=4.9 ug/dL Final     HIV Antigen Antibody Combo 08/04/2022 Nonreactive  Nonreactive Final     Rubella Scarlett IgG Instrument Value 08/04/2022 0.83  <0.90 Index Final     Rubella Antibody IgG 08/04/2022 No detectable antibody.   Final     Hepatitis B Surface Antigen 08/04/2022 Nonreactive  Nonreactive Final     Treponema Antibody Total 08/04/2022 Nonreactive  Nonreactive Final     Hepatitis B Surface Antibody Instr* 08/04/2022 30.03  <8.00 m[IU]/mL Final     Iron 08/04/2022 114  37 - 145 ug/dL Final     Iron Sat Index 08/04/2022 50 (A) 15 - 46 % Final     Iron Binding Capacity 08/04/2022 228 (A) 240 - 430 ug/dL Final     Sodium 08/04/2022 136  136 - 145 mmol/L Final     Potassium 08/04/2022 3.6  3.4 - 5.3 mmol/L Final     Creatinine 08/04/2022 0.44 (A) 0.51 - 0.95 mg/dL Final     Urea Nitrogen 08/04/2022 7.5  6.0 - 20.0 mg/dL Final     Chloride 08/04/2022 103  98 - 107 mmol/L Final     Carbon Dioxide (CO2) 08/04/2022 21 (A) 22 - 29 mmol/L Final     Anion Gap 08/04/2022 12  7 - 15 mmol/L Final     Glucose 08/04/2022 103 (A) 70 - 99 mg/dL Final     Calcium 08/04/2022 9.3  8.6 - 10.0 mg/dL Final     Protein Total 08/04/2022 7.5  6.4 - 8.3 g/dL Final     Albumin 08/04/2022 4.0  3.5 - 5.2 g/dL Final     Bilirubin Total 08/04/2022 0.5  <=1.2 mg/dL Final     Alkaline Phosphatase 08/04/2022 49  35 - 104 U/L Final     AST 08/04/2022 25  10 - 35 U/L Final     ALT 08/04/2022 14  10 - 35 U/L Final     GFR Estimate 08/04/2022 >90  >60 mL/min/1.73m2 Final     ABO/RH(D) 08/04/2022 O POS   Final     Antibody Screen 08/04/2022 Negative  Negative Final     SPECIMEN EXPIRATION DATE 08/04/2022 [id number]   Final     Color Urine 08/04/2022 Yellow  Colorless, Straw, Light Yellow, Yellow Final     Appearance Urine 08/04/2022 Clear  Clear Final     Glucose Urine 08/04/2022 Negative  Negative mg/dL Final     Bilirubin Urine 08/04/2022 Negative  Negative Final     Ketones  Urine 08/04/2022 Negative  Negative mg/dL Final     Specific Gravity Urine 08/04/2022 1.025  1.005 - 1.030 Final     Blood Urine 08/04/2022 Trace (A) Negative Final     pH Urine 08/04/2022 6.5  5.0 - 8.0 Final     Protein Albumin Urine 08/04/2022 Negative  Negative mg/dL Final     Urobilinogen Urine 08/04/2022 0.2  0.2, 1.0 E.U./dL Final     Nitrite Urine 08/04/2022 Negative  Negative Final     Leukocyte Esterase Urine 08/04/2022 Negative  Negative Final     Culture 08/04/2022 <10,000 CFU/mL Urogenital rio   Final     Bacteria Urine 08/04/2022 Few (A) None Seen /HPF Final     RBC Urine 08/04/2022 0-2  0-2 /HPF /HPF Final     WBC Urine 08/04/2022 0-5  0-5 /HPF /HPF Final     Squamous Epithelials Urine 08/04/2022 Few (A) None Seen /LPF Final     WBC Count 08/04/2022 11.2 (A) 4.0 - 11.0 10e3/uL Final     RBC Count 08/04/2022 5.08  3.80 - 5.20 10e6/uL Final     Hemoglobin 08/04/2022 10.3 (A) 11.7 - 15.7 g/dL Final     Hematocrit 08/04/2022 32.0 (A) 35.0 - 47.0 % Final     MCV 08/04/2022 63 (A) 78 - 100 fL Final     MCH 08/04/2022 20.3 (A) 26.5 - 33.0 pg Final     MCHC 08/04/2022 32.2  31.5 - 36.5 g/dL Final     RDW 08/04/2022 15.9 (A) 10.0 - 15.0 % Final     Platelet Count 08/04/2022 293  150 - 450 10e3/uL Final     % Neutrophils 08/04/2022 75  % Final     % Lymphocytes 08/04/2022 17  % Final     % Monocytes 08/04/2022 6  % Final     % Eosinophils 08/04/2022 1  % Final     % Basophils 08/04/2022 0  % Final     % Immature Granulocytes 08/04/2022 0  % Final     Absolute Neutrophils 08/04/2022 8.4 (A) 1.6 - 8.3 10e3/uL Final     Absolute Lymphocytes 08/04/2022 1.8  0.8 - 5.3 10e3/uL Final     Absolute Monocytes 08/04/2022 0.7  0.0 - 1.3 10e3/uL Final     Absolute Eosinophils 08/04/2022 0.2  0.0 - 0.7 10e3/uL Final     Absolute Basophils 08/04/2022 0.0  0.0 - 0.2 10e3/uL Final     Absolute Immature Granulocytes 08/04/2022 0.0  <=0.4 10e3/uL Final          Other screening pregnancy lab work is pending.      Please see OB  Episode for complete details.    This dictation uses voice recognition software, which may contain typographical errors.              Objective           Vitals:  No vitals were obtained today due to virtual visit.    Physical Exam   healthy, alert and no distress  PSYCH: Alert and oriented times 3; coherent speech, normal   rate and volume, able to articulate logical thoughts, able   to abstract reason, no tangential thoughts, no hallucinations   or delusions  Her affect is normal  RESP: No cough, no audible wheezing, able to talk in full sentences  Remainder of exam unable to be completed due to telephone visits            Phone call duration: 20 minutes

## 2022-09-09 NOTE — LETTER
09/09/22          To Whom It May Concern:      Caroline Rowe (1995) is pregnant.  Estimated Date of Delivery: Feb 20, 2023        Sincerely,    Jennifer Chavez PA-C

## 2022-09-09 NOTE — TELEPHONE ENCOUNTER
Please call patient and schedule OB appointment with Dr. Bush in 1 month.    She already had her first OB appointment, so just needs a regular OB appointment.

## 2022-09-22 ENCOUNTER — HOSPITAL ENCOUNTER (OUTPATIENT)
Dept: PHYSICAL THERAPY | Facility: REHABILITATION | Age: 27
Discharge: HOME | End: 2022-09-22
Attending: FAMILY MEDICINE
Payer: COMMERCIAL

## 2022-09-22 DIAGNOSIS — G89.29 CHRONIC BILATERAL LOW BACK PAIN WITHOUT SCIATICA: ICD-10-CM

## 2022-09-22 DIAGNOSIS — Z34.80 SUPERVISION OF OTHER NORMAL PREGNANCY: ICD-10-CM

## 2022-09-22 DIAGNOSIS — M54.50 CHRONIC BILATERAL LOW BACK PAIN WITHOUT SCIATICA: ICD-10-CM

## 2022-09-22 PROCEDURE — 97110 THERAPEUTIC EXERCISES: CPT | Mod: GP | Performed by: PHYSICAL THERAPIST

## 2022-09-22 PROCEDURE — 97140 MANUAL THERAPY 1/> REGIONS: CPT | Mod: GP | Performed by: PHYSICAL THERAPIST

## 2022-09-22 PROCEDURE — 97161 PT EVAL LOW COMPLEX 20 MIN: CPT | Mod: GP | Performed by: PHYSICAL THERAPIST

## 2022-09-22 NOTE — PROGRESS NOTES
"Subjective: Has been having back pain after having her first child and then since getting pregnant again her pain has increased greatly. The pain is preventing her from sleeping for a long time or sitting for longer than 30 minutes.     Assessment: Caroline presents to therapy with c/o worsening lumbosacral pain. She has significant tenderness in her deep hip rotators bilaterally. She reported no pain with the exercises given today. Caroline's sacrum is counternutated, causing slight protrusion at the base of her lumbar spine. She will benefit from physical therapy in order to promote more normal sacral positioning and to decrease her overall pain.    Therapeutic Exercise    Date 9/22/22   Exercise    Isometric hip add 5\" x10   Isometric hip abd 5\" x10   LTR x10 B                                           "

## 2022-09-22 NOTE — ADDENDUM NOTE
Encounter addended by: Stas Walsh, PT on: 9/22/2022 4:31 PM   Actions taken: Document created, Document edited

## 2022-09-22 NOTE — PROGRESS NOTES
[facility]    OUTPATIENT PHYSICAL THERAPY ORTHOPEDIC EVALUATION  PLAN OF TREATMENT FOR OUTPATIENT REHABILITATION  (COMPLETE FOR INITIAL CLAIMS ONLY)  Patient's Last Name, First Name, M.I.  YOB: 1995  Caroline Rowe    Provider s Name:  [facility]   Medical Record No.  [id number]   Start of Care Date:  09/22/22   Onset Date:  08/04/22   Type:     _X__PT   ___OT   ___SLP Medical Diagnosis:  Chronic bilateral low back pain without sciatica     PT Diagnosis:  SI dysfunction   Visits from SOC:  1      _________________________________________________________________________________  Plan of Treatment/Functional Goals:  joint mobilization, manual therapy, neuromuscular re-education, ROM, strengthening, stretching           Goals  Goal Identifier: LOYDA  Goal Description: Caroline will demonstrate a decrease in pain and increase in function as measured by scoring </= 24% on the LOYDA.  Target Date: 11/21/22    Goal Identifier: Sitting duration  Goal Description: Caroline will be able to sit for 1+ hours without any increase in her pain in order to improve her QOL.  Target Date: 11/21/22    Goal Identifier: Morning pain/stiffness  Goal Description: Caroline will be able to touch her toes in the mornings with </=4/10 pain in order to improve her QOL.  Target Date: 11/21/22    Goal Identifier: Sacram positioning  Goal Description: Caroline will demonstrate a neutral sacrum position in order to reduce and prevent her pain.  Target Date: 11/21/22                                                Therapy Frequency:  1 time/week  Predicted Duration of Therapy Intervention:  8 weeks    BESSY HATFIELD, PT                 I CERTIFY THE NEED FOR THESE SERVICES FURNISHED UNDER        THIS PLAN OF TREATMENT AND WHILE UNDER MY CARE     (Physician co-signature of this document indicates review and  certification of the therapy plan).                     Certification Date From:  09/22/22   Certification Date To:  11/21/22    Referring Provider:  Betty Bush MD    Initial Assessment        See Epic Evaluation Start of Care Date: 09/22/22 09/22/22 1500   General Information   Type of Visit Initial OP Ortho PT Evaluation   Start of Care Date 09/22/22   Referring Physician Betty Bush MD   Patient/Family Goals Statement Pain to go away   Orders Evaluate and Treat   Date of Order 08/04/22   Certification Required? Yes   Medical Diagnosis Chronic bilateral low back pain without sciatica   Surgical/Medical history reviewed Yes   Precautions/Limitations no known precautions/limitations   Body Part(s)   Body Part(s) Lumbar Spine/SI   Presentation and Etiology   Pertinent history of current problem (include personal factors and/or comorbidities that impact the POC) See note   Impairments A. Pain   Functional Limitations perform activities of daily living;perform desired leisure / sports activities   Symptom Location Lower lumbar and sacrum   How/Where did it occur From insidious onset   Onset date of current episode/exacerbation 08/04/22   Chronicity New   Pain rating (0-10 point scale) Best (/10);Worst (/10)   Best (/10) 6   Worst (/10) 9   Pain quality A. Sharp   Frequency of pain/symptoms A. Constant   Pain/symptoms are: Worse in the morning   Pain/symptoms exacerbated by A. Sitting;B. Walking   Pain/symptoms eased by E. Changing positions   Progression of symptoms since onset: Worsened   Fall Risk Screen   Fall screen completed by PT   Have you fallen 2 or more times in the past year? No   Have you fallen and had an injury in the past year? No   Is patient a fall risk? No   Abuse Screen (yes response referral indicated)   Feels Unsafe at Home or Work/School no   Feels Threatened by Someone no   Does Anyone Try to Keep You From Having Contact with Others or Doing Things Outside Your Home? no    Physical Signs of Abuse Present no   Patient needs abuse support services and resources No   System Outcome Measures   Outcome Measures   (LOYDA: 48%)   Lumbar Spine/SI Objective Findings   Gait/Locomotion WNL   Flexion ROM WNL   Extension ROM WNL   Right Side Bending ROM WNL   Left Side Bending ROM WNL   Pelvic Screen Counternutation   Hip Screen -   Hip Flexion (L2) Strength 4-/5   Hip Abduction Strength 3+/5   Hip Adduction Strength 5   Hip Extension Strength 4   Knee Flexion Strength 4   SLR -   Alverto Test -   Ely Test -   Crossover SLR -   Lumbar/SI Special Tests Comments Positive sacral thrust, thigh thrust   Segmental Mobility Mild pain and hypomobility.   Palpation Tenderness and pain in lumbar paraspinals and significant tenderness in deep hip rotators bilaterally.   Slump Test -   Posture WNL   Planned Therapy Interventions   Planned Therapy Interventions joint mobilization;manual therapy;neuromuscular re-education;ROM;strengthening;stretching   Clinical Impression   Criteria for Skilled Therapeutic Interventions Met yes, treatment indicated   PT Diagnosis SI dysfunction   Influenced by the following impairments Hip weakness   Functional limitations due to impairments Decreased sitting tolerance.   Clinical Presentation Evolving/Changing   Clinical Decision Making (Complexity) Low complexity   Therapy Frequency 1 time/week   Predicted Duration of Therapy Intervention (days/wks) 8 weeks   Risk & Benefits of therapy have been explained Yes   Patient, Family & other staff in agreement with plan of care Yes   ORTHO GOALS   PT Ortho Eval Goals 1;2;3;4   Ortho Goal 1   Goal Identifier LOYDA   Goal Description Caroline will demonstrate a decrease in pain and increase in function as measured by scoring </= 24% on the LOYDA.   Goal Progress 48%   Target Date 11/21/22   Ortho Goal 2   Goal Identifier Sitting duration   Goal Description Caroline will be able to sit for 1+ hours without any increase in her pain in order to  improve her QOL.   Goal Progress 30 minutes   Target Date 11/21/22   Ortho Goal 3   Goal Identifier Morning pain/stiffness   Goal Description Caroline will be able to touch her toes in the mornings with </=4/10 pain in order to improve her QOL.   Goal Progress 9/10   Target Date 11/21/22   Ortho Goal 4   Goal Identifier Sacram positioning   Goal Description Caroline will demonstrate a neutral sacrum position in order to reduce and prevent her pain.   Goal Progress Counternutated   Target Date 11/21/22   Total Evaluation Time   PT Eval, Low Complexity Minutes (21495) 15   Therapy Certification   Certification date from 09/22/22   Certification date to 11/21/22   Medical Diagnosis Chronic bilateral low back pain without sciatica

## 2022-09-29 ENCOUNTER — HOSPITAL ENCOUNTER (OUTPATIENT)
Dept: PHYSICAL THERAPY | Facility: REHABILITATION | Age: 27
Discharge: HOME | End: 2022-09-29
Payer: COMMERCIAL

## 2022-09-29 DIAGNOSIS — G89.29 CHRONIC BILATERAL LOW BACK PAIN WITHOUT SCIATICA: ICD-10-CM

## 2022-09-29 DIAGNOSIS — M54.50 CHRONIC BILATERAL LOW BACK PAIN WITHOUT SCIATICA: ICD-10-CM

## 2022-09-29 DIAGNOSIS — Z34.80 SUPERVISION OF OTHER NORMAL PREGNANCY: Primary | ICD-10-CM

## 2022-09-29 PROCEDURE — 97140 MANUAL THERAPY 1/> REGIONS: CPT | Mod: GP | Performed by: PHYSICAL THERAPIST

## 2022-09-29 PROCEDURE — 97110 THERAPEUTIC EXERCISES: CPT | Mod: GP | Performed by: PHYSICAL THERAPIST

## 2022-09-29 NOTE — PROGRESS NOTES
"Subjective: Not getting as much pain in the mornings, but still gets a lot of pain after standing for prolonged periods of time, around an hour.     Assessment: Caroline reported a slight decrease in her pain following manual therapy today. She was able to achieve a solid TA contraction with minimal tactile and verbal cuing. She did have minor increase in her symptoms with the pallof press, but her pain was reduced again with the QL stretch. She will likely benefit form STM to her QL in order to promote decrease in her QL pain.    Therapeutic Exercise    Date 9/29/22 9/22/22   Exercise     Isometric hip add  5\" x10   Isometric hip abd  5\" x10   LTR x10 B x10 B   Supine QL stretch 4 X30\" B    Ab set 5\" x10    Ab set c marching x10 B    Paloff press BTB 5\" x15                                 "

## 2022-10-10 ENCOUNTER — HOSPITAL ENCOUNTER (OUTPATIENT)
Dept: ULTRASOUND IMAGING | Facility: CLINIC | Age: 27
Discharge: HOME | End: 2022-10-10
Attending: PHYSICIAN ASSISTANT | Admitting: PHYSICIAN ASSISTANT
Payer: COMMERCIAL

## 2022-10-10 DIAGNOSIS — Z34.80 SUPERVISION OF OTHER NORMAL PREGNANCY: ICD-10-CM

## 2022-10-10 PROCEDURE — 76805 OB US >/= 14 WKS SNGL FETUS: CPT

## 2022-10-17 ENCOUNTER — PRENATAL OFFICE VISIT (OUTPATIENT)
Dept: FAMILY MEDICINE | Facility: CLINIC | Age: 27
End: 2022-10-17
Payer: COMMERCIAL

## 2022-10-17 VITALS
DIASTOLIC BLOOD PRESSURE: 64 MMHG | WEIGHT: 135 LBS | RESPIRATION RATE: 28 BRPM | SYSTOLIC BLOOD PRESSURE: 100 MMHG | HEART RATE: 105 BPM | BODY MASS INDEX: 25.23 KG/M2

## 2022-10-17 DIAGNOSIS — D56.3 BETA THALASSEMIA MINOR: ICD-10-CM

## 2022-10-17 DIAGNOSIS — K59.04 FUNCTIONAL CONSTIPATION: ICD-10-CM

## 2022-10-17 DIAGNOSIS — Z34.80 SUPERVISION OF OTHER NORMAL PREGNANCY, ANTEPARTUM: Primary | ICD-10-CM

## 2022-10-17 PROBLEM — Z34.90 SUPERVISION OF NORMAL PREGNANCY: Status: ACTIVE | Noted: 2022-08-04

## 2022-10-17 PROCEDURE — 90471 IMMUNIZATION ADMIN: CPT | Performed by: FAMILY MEDICINE

## 2022-10-17 PROCEDURE — 90686 IIV4 VACC NO PRSV 0.5 ML IM: CPT | Performed by: FAMILY MEDICINE

## 2022-10-17 PROCEDURE — 91312 COVID-19,PF,PFIZER BOOSTER BIVALENT: CPT | Performed by: FAMILY MEDICINE

## 2022-10-17 PROCEDURE — 0124A COVID-19,PF,PFIZER BOOSTER BIVALENT: CPT | Performed by: FAMILY MEDICINE

## 2022-10-17 PROCEDURE — 99207 PR PRENATAL VISIT: CPT | Performed by: FAMILY MEDICINE

## 2022-10-17 RX ORDER — DOCUSATE SODIUM 100 MG/1
100 CAPSULE, LIQUID FILLED ORAL 2 TIMES DAILY
Qty: 100 CAPSULE | Refills: 3 | Status: SHIPPED | OUTPATIENT
Start: 2022-10-17 | End: 2023-02-24

## 2022-10-17 RX ORDER — CHLORAL HYDRATE 500 MG
2 CAPSULE ORAL DAILY
Qty: 100 CAPSULE | Refills: 4 | Status: SHIPPED | OUTPATIENT
Start: 2022-10-17 | End: 2023-05-01

## 2022-10-17 RX ORDER — DOCUSATE SODIUM 100 MG/1
200 CAPSULE, LIQUID FILLED ORAL 2 TIMES DAILY
Qty: 100 CAPSULE | Refills: 3 | Status: SHIPPED | OUTPATIENT
Start: 2022-10-17 | End: 2022-10-17

## 2022-10-17 RX ORDER — FERROUS GLUCONATE 324(38)MG
324 TABLET ORAL EVERY OTHER DAY
Qty: 100 TABLET | Refills: 3 | Status: SHIPPED | OUTPATIENT
Start: 2022-10-17 | End: 2023-01-24

## 2022-10-17 NOTE — PATIENT INSTRUCTIONS
Beta Thalassemia Trait  Jose F needs to be checked to see if he has thalassemia. If he does, we would do additional monitoring for your baby to make sure she's safe and healthy.

## 2022-10-17 NOTE — PROGRESS NOTES
First OB Appointment    Assessment & Plan:  1. Dating: Final EDC is Feb 20, 2023. Based on LMP c/w 13wUS  2. Aspirin: Based on her risk factors, Caroline is NOT at high risk of preeclampsia. Low-dose aspirin prophylaxis is NOT recommended for prevention of preeclampsia.   3. Weight: Based on prepregnancy 26.16, recommended weight gain of 7 kg (15 lb)-11.5 kg (25 lb).  4. Diabetes risk: Based on her risk factors, Caroline is at increased risk of DM2/GDM, glucose 103.   5. Trisomy screening: declines.  6. Carrier screening for SMA and CF: declines.  7. Beta thalassemia trait: partner needs testing. He's getting insurance now.   8. Planning travel: Cruise to [place] and [facility].    Order Summary    ICD-10-CM    1. Supervision of other normal pregnancy, antepartum  Z34.80 ferrous gluconate (FERGON) 324 (38 Fe) MG tablet     fish oil-omega-3 fatty acids 1000 MG capsule      2. Beta thalassemia minor  D56.3       3. Functional constipation  K59.04 docusate sodium (COLACE) 100 MG capsule     DISCONTINUED: docusate sodium (COLACE) 100 MG capsule       No future appointments.  Completed by: Betty Bush M.D., [facility]. 10/17/2022 11:32 AM.  This transcription uses voice recognition software, which may contain typographical errors.    Subjective:  This is a planned pregnancy. The patient feels good about it. Current pregnancy symptoms include: nausea/vomiting.     Preeclampsia risk factors - at increased risk if 1+ high risk or 2+ moderate risk factors    High risk factors (1+):NONE    Moderate risk factors (2+): NONE     Diabetes risk factors - at increased risk if BMI 25+ and 1+ additional risk factors      Prepregnancy 26.16     Additional risk factors (1+): Ethnicity: , , ,  American,     I reviewed the following components of the patient chart today:    OB intake note    Active problems    Past Medical History    Medications    Allergies    Family  History    Social History    Genetic Questionairre    Prenatal Labs    Prenatal Ultrasound    OB history  OB History    Para Term  AB Living   2 1 1 0 0 1   SAB IAB Ectopic Multiple Live Births   0 0 0 0 1      # Outcome Date GA Lbr Fernando/2nd Weight Sex Delivery Anes PTL Lv   2 Current            1 Term 21 39w1d 07:51 / 02:56 3.289 kg (7 lb 4 oz) M Vag-Spont Nitrous, IV N EMIR      Name: [place] Fontaine      Apgar1: 5  Apgar5: 9       Objective:  /64 (BP Location: Left arm, Patient Position: Sitting, Cuff Size: Adult Regular)   Pulse 105   Resp 28   Wt 61.2 kg (135 lb)   LMP 2022 (Exact Date)   BMI 25.23 kg/m   Body mass index is 25.23 kg/m .   See prenatal flowsheet and physical exam portion of prenatal episode.    40 minutes spent on the day of encounter doing chart review, history and exam, documentation,  and review of patient's current health status and pregnancy.

## 2022-10-19 ENCOUNTER — MYC MEDICAL ADVICE (OUTPATIENT)
Dept: FAMILY MEDICINE | Facility: CLINIC | Age: 27
End: 2022-10-19

## 2022-10-25 NOTE — TELEPHONE ENCOUNTER
This looks like a form for her maternity leave?  Please fill in the dates for her maternity leave and vitals information on the form.  I completed is much as I could and I think we just need the dates now agreeable be ready for her.

## 2022-11-01 NOTE — ADDENDUM NOTE
Encounter addended by: Stas Walsh, PT on: 11/1/2022 2:42 PM   Actions taken: Episode resolved, Clinical Note Signed

## 2022-11-01 NOTE — PROGRESS NOTES
[facility] Rehabilitation Service    Outpatient Physical Therapy Discharge Note  Patient: Caroline Rowe  : 1995    Beginning/End Dates of Reporting Period:  22 to 22    Referring Provider: Betty Bush MD    Therapy Diagnosis: SI dysfunction     Client Self Report: See note    Objective Measurements:  Objective Measure: Sacral orientation  Details: Counternutation       Goals:  Goal Identifier LOYDA   Goal Description Caroline will demonstrate a decrease in pain and increase in function as measured by scoring </= 24% on the LOYDA.   Target Date 22   Date Met      Progress (detail required for progress note): 48%     Goal Identifier Sitting duration   Goal Description Caroline will be able to sit for 1+ hours without any increase in her pain in order to improve her QOL.   Target Date 22   Date Met      Progress (detail required for progress note): 30 minutes     Goal Identifier Morning pain/stiffness   Goal Description Caroline will be able to touch her toes in the mornings with </=4/10 pain in order to improve her QOL.   Target Date 22   Date Met      Progress (detail required for progress note): 9/10     Goal Identifier Sacram positioning   Goal Description Caroline will demonstrate a neutral sacrum position in order to reduce and prevent her pain.   Target Date 22   Date Met      Progress (detail required for progress note): Counternutated     Plan:  Discharge from therapy.    Discharge:    Reason for Discharge: Patient chooses to discontinue therapy.    Discharge Plan: Patient to continue home program.

## 2022-11-04 ENCOUNTER — MYC MEDICAL ADVICE (OUTPATIENT)
Dept: FAMILY MEDICINE | Facility: CLINIC | Age: 27
End: 2022-11-04

## 2022-11-07 NOTE — TELEPHONE ENCOUNTER
Completed form is in my out box.  She is due for an OB visit and has none scheduled. Please schedule within 3 weeks. OK to DB, if needed.

## 2022-12-01 ENCOUNTER — VIRTUAL VISIT (OUTPATIENT)
Dept: FAMILY MEDICINE | Facility: CLINIC | Age: 27
End: 2022-12-01
Payer: COMMERCIAL

## 2022-12-01 DIAGNOSIS — Z34.90 ENCOUNTER FOR SUPERVISION OF NORMAL PREGNANCY, ANTEPARTUM, UNSPECIFIED GRAVIDITY: Primary | ICD-10-CM

## 2022-12-01 LAB
CLUE CELLS: NORMAL
FIBRONECTIN FETAL VAG QL: NEGATIVE
GLUCOSE 1H P 50 G GLC PO SERPL-MCNC: 120 MG/DL (ref 70–129)
HGB BLD-MCNC: 9.2 G/DL (ref 11.7–15.7)
TRICHOMONAS, WET PREP: NORMAL
WBC'S/HIGH POWER FIELD, WET PREP: NORMAL
YEAST, WET PREP: NORMAL

## 2022-12-01 PROCEDURE — 87591 N.GONORRHOEAE DNA AMP PROB: CPT | Performed by: FAMILY MEDICINE

## 2022-12-01 PROCEDURE — 36415 COLL VENOUS BLD VENIPUNCTURE: CPT | Performed by: FAMILY MEDICINE

## 2022-12-01 PROCEDURE — 87653 STREP B DNA AMP PROBE: CPT | Performed by: FAMILY MEDICINE

## 2022-12-01 PROCEDURE — 90471 IMMUNIZATION ADMIN: CPT | Performed by: FAMILY MEDICINE

## 2022-12-01 PROCEDURE — 87491 CHLMYD TRACH DNA AMP PROBE: CPT | Performed by: FAMILY MEDICINE

## 2022-12-01 PROCEDURE — 99207 PR PRENATAL VISIT: CPT | Performed by: FAMILY MEDICINE

## 2022-12-01 PROCEDURE — 90715 TDAP VACCINE 7 YRS/> IM: CPT | Performed by: FAMILY MEDICINE

## 2022-12-01 PROCEDURE — 82950 GLUCOSE TEST: CPT | Performed by: FAMILY MEDICINE

## 2022-12-01 PROCEDURE — 87210 SMEAR WET MOUNT SALINE/INK: CPT | Performed by: FAMILY MEDICINE

## 2022-12-01 PROCEDURE — 82731 ASSAY OF FETAL FIBRONECTIN: CPT | Performed by: FAMILY MEDICINE

## 2022-12-01 PROCEDURE — 86780 TREPONEMA PALLIDUM: CPT | Performed by: FAMILY MEDICINE

## 2022-12-01 NOTE — PROGRESS NOTES
"Caroline is a 26 year old who is being evaluated via a billable video visit.      How would you like to obtain your AVS? MyChart  If the video visit is dropped, the invitation should be resent by: Text to cell phone: [phone]  Will anyone else be joining your video visit? No  {If patient encounters technical issues they should call [phone] :397720}        {PROVIDER CHARTING PREFERENCE:941595}    Subjective   Caroline is a 26 year old{ACCOMPANIED BY STATEMENT (Optional):538913}, presenting for the following health issues:  Prenatal Care and Vaginal Problem      Vaginal Problem          {SUPERLIST (Optional):054784}  {additonal problems for provider to add (Optional):401894}    Review of Systems   Genitourinary: Positive for vaginal discharge.      {ROS COMP (Optional):271992}      Objective           Vitals:  No vitals were obtained today due to virtual visit.    Physical Exam   {video visit exam brief selected:545900::\"GENERAL: Healthy, alert and no distress\",\"EYES: Eyes grossly normal to inspection.  No discharge or erythema, or obvious scleral/conjunctival abnormalities.\",\"RESP: No audible wheeze, cough, or visible cyanosis.  No visible retractions or increased work of breathing.  \",\"SKIN: Visible skin clear. No significant rash, abnormal pigmentation or lesions.\",\"NEURO: Cranial nerves grossly intact.  Mentation and speech appropriate for age.\",\"PSYCH: Mentation appears normal, affect normal/bright, judgement and insight intact, normal speech and appearance well-groomed.\"}    {Diagnostic Test Results (Optional):195791}    {AMBULATORY ATTESTATION (Optional):854499}        Video-Visit Details    Video Start Time: {video visit start/end time for provider to select:161385}    Type of service:  Video Visit    Video End Time:{video visit start/end time for provider to select:761206}    Originating Location (pt. Location): {video visit patient location:007818::\"Home\"}    {PROVIDER LOCATION On-site should be selected " "for visits conducted from your clinic location or adjoining [facility] hospital, academic office, or other nearby [facility] building. Off-site should be selected for all other provider locations, including home:511180}    Distant Location (provider location):  {virtual location provider:863051}    Platform used for Video Visit: {Virtual Visit Platforms:916000::\"[employer]\"}    "

## 2022-12-01 NOTE — PROGRESS NOTES
Subjective:    Leaking Fluid  Went on a cruise. Started leaking clear fluid while on her trip.  Since then, she continues to have clear, watery discharge.   It seems to be getting a little worse.  No odor, doesn't seem like urine. Seems like when her water broke with her last pregnancy.   Feeling otherwise well. No fever or abdominal pain.   Water had a small amount of pink/blood in it.    OB ROS:   Headache: None.   Vision change: None.   Abnormal vaginal discharge: None.   Bleeding: None.   Fetal movement: Normal.     Objective:  LMP 2022 (Exact Date)    Prepregnancy BMI: 26.16. Total weight gain: -2.268 kg (-5 lb)   Exam: see flowsheet.  Genitourinary: normal appearance of vulva, vagina, and cervix. No abnormal discharge.  Virtual Visit on 2022   Component Date Value Ref Range Status     Trichomonas 2022 Absent  Absent Final     Yeast 2022 Absent  Absent Final     Clue Cells 2022 Absent  Absent Final     WBCs/high power field 2022 None  None Final     Hemoglobin 2022 9.2 (L)  11.7 - 15.7 g/dL Final     Glu Gest Screen 1hr 50g 2022 120  70 - 129 mg/dL Final      Immunizations Administered     Name Date Dose VIS Date Route    Tdap (Adacel,Boostrix) 22 12:45 PM 0.5 mL 2021, Given Today Intramuscular        Assessment/Plan:    26 year old  at 28w3d.    Due to prepregnancy BMI of 26.16, weight gain of -2.268 kg (-5 lb) is insufficient. The following recommendations were made: reviewed weight tracking graph in Epic with patient.    GDM screening done.    Tdap given.    Leaking fluid. Normal exam. Caroline declines US to assess fluid level. Await results of tests done today.   Future Appointments   Date Time Provider Department Center   2022  3:00 PM Betty Bush MD [facility]      Order Summary                                                      Encounter for supervision of normal pregnancy, antepartum, unspecified   -     Chlamydia  trachomatis PCR  -     Neisseria gonorrhoeae PCR  -     Wet preparation  -     Fetal fibronectin  -     Group B strep PCR  -     OB Hemoglobin; Future  -     Treponema Abs w Reflex to RPR and Titer; Future  -     Glucose Challenge Test (GCT) 1 Hour; Future  -     OB Hemoglobin  -     Treponema Abs w Reflex to RPR and Titer  -     Glucose Challenge Test (GCT) 1 Hour  -     Fern Test for Rupture of Membranes; Future    Other orders  -     TDAP VACCINE (Adacel, Boostrix)  [[id number]]        Completed by: Betty Bush M.D., [facility]. 12/1/2022 2:18 PM.  MDM: [place] neighborhood: [place] [address], language: English, MDM: 15 minutes spent on the date of the encounter doing chart review, history and exam, documentation and further activities per the note.

## 2022-12-02 ENCOUNTER — TELEPHONE (OUTPATIENT)
Dept: FAMILY MEDICINE | Facility: CLINIC | Age: 27
End: 2022-12-02

## 2022-12-02 LAB
C TRACH DNA SPEC QL NAA+PROBE: NEGATIVE
GP B STREP DNA SPEC QL NAA+PROBE: NEGATIVE
N GONORRHOEA DNA SPEC QL NAA+PROBE: NEGATIVE
T PALLIDUM AB SER QL: NONREACTIVE

## 2022-12-02 NOTE — TELEPHONE ENCOUNTER
Writer called and spoke to pt regarding message below.    Patient asking PCP if getting the iron infusion is a one time thing or does she have to go every other week?    Pt will determine which route she wants to take once Dr. Bush' replies back.    CAIN BarrowN, RN   [facility]    ----- Message from Betty Bush MD sent at 12/2/2022 10:17 AM CST -----  Please let Caroline know:  She is pretty low on iron. I'd like her to get more iron. She could either take more iron pills or she could do IV iron. The IV iron will get it in faster and with less stomach upset, if she wants to go that route.

## 2022-12-06 LAB — CRYSTALS AMN MICRO: NORMAL

## 2022-12-07 NOTE — TELEPHONE ENCOUNTER
RN called patient to relay Dr. Bush' message below.    Patient can't do the IV iron infusion because of the work and no sitter.    Patient will choose to take iron pill by mouth.    RN will call patient back to assist her to make next OB appointment.     Future Appointments   Date Time Provider Department Center   12/16/2022  4:40 PM Betty Bush MD [facility]         Neda Diaz RN  [facility]

## 2022-12-11 ENCOUNTER — MYC MEDICAL ADVICE (OUTPATIENT)
Dept: FAMILY MEDICINE | Facility: CLINIC | Age: 27
End: 2022-12-11

## 2022-12-12 NOTE — TELEPHONE ENCOUNTER
VISIT FACILITATOR collected or printed forms from . Forms pre-filled for provider review, completion, and signature. Forms faxed to Dr. Betty Bush at [facility]. Thanks.

## 2022-12-16 ENCOUNTER — PRENATAL OFFICE VISIT (OUTPATIENT)
Dept: FAMILY MEDICINE | Facility: CLINIC | Age: 27
End: 2022-12-16
Payer: COMMERCIAL

## 2022-12-16 VITALS
HEART RATE: 97 BPM | RESPIRATION RATE: 18 BRPM | WEIGHT: 145 LBS | DIASTOLIC BLOOD PRESSURE: 68 MMHG | TEMPERATURE: 98.1 F | HEIGHT: 61 IN | OXYGEN SATURATION: 98 % | SYSTOLIC BLOOD PRESSURE: 101 MMHG | BODY MASS INDEX: 27.38 KG/M2

## 2022-12-16 DIAGNOSIS — Z34.83 ENCOUNTER FOR SUPERVISION OF OTHER NORMAL PREGNANCY IN THIRD TRIMESTER: Primary | ICD-10-CM

## 2022-12-16 PROCEDURE — 99207 PR PRENATAL VISIT: CPT | Performed by: FAMILY MEDICINE

## 2022-12-16 RX ORDER — PRENATAL VIT/IRON FUM/FOLIC AC 27MG-0.8MG
1 TABLET ORAL DAILY
Qty: 90 TABLET | Refills: 3 | Status: SHIPPED | OUTPATIENT
Start: 2022-12-16 | End: 2023-05-01

## 2022-12-16 NOTE — PROGRESS NOTES
"Subjective:    No problems    OB ROS:   Headache: None.   Vision change: Yes. Blurry vision.   Abnormal vaginal discharge: None.   Bleeding: None.   Fetal movement: Normal.     Objective:  /68 (BP Location: Left arm, Patient Position: Sitting, Cuff Size: Adult Regular)   Pulse 97   Temp 98.1  F (36.7  C) (Temporal)   Resp 18   Ht 1.549 m (5' 1\")   Wt 65.8 kg (145 lb)   LMP 2022 (Exact Date)   SpO2 98%   BMI 27.40 kg/m     Prepregnancy BMI: 26.47. Total weight gain: 2.268 kg (5 lb)   Exam: see flowsheet.  No visits with results within 1 Day(s) from this visit.   Latest known visit with results is:   Virtual Visit on 2022   Component Date Value Ref Range Status     Chlamydia trachomatis 2022 Negative  Negative Final    A negative result by transcription mediated amplification does not preclude the presence of C. trachomatis infection because results are dependent on proper and adequate collection, absence of inhibitors and sufficient rRNA to be detected.     Neisseria gonorrhoeae 2022 Negative  Negative Final    Negative for N. gonorrhoeae rRNA by transcription mediated amplification. A negative result by transcription mediated amplification does not preclude the presence of C. trachomatis infection because results are dependent on proper and adequate collection, absence of inhibitors and sufficient rRNA to be detected.     Trichomonas 2022 Absent  Absent Final     Yeast 2022 Absent  Absent Final     Clue Cells 2022 Absent  Absent Final     WBCs/high power field 2022 None  None Final     Fetal Fibronectin 2022 Negative  Negative Final    A negative fetal fibronectin result indicates a low risk for  birth.     Group B Strep PCR 2022 Negative  Negative Final    Presumed negative for Streptococcus agalactiae (Group B Streptococcus) or the number of organisms may be below the limit of detection of the assay.     Hemoglobin 2022 9.2 (L)  " 11.7 - 15.7 g/dL Final     Treponema Antibody Total 2022 Nonreactive  Nonreactive Final     Glu Gest Screen 1hr 50g 2022 120  70 - 129 mg/dL Final     Fern Crystallization 2022 No ferning present  No ferning present Final      Assessment/Plan:    26 year old  at 30w4d.    Due to prepregnancy BMI of 26.47, weight gain of 2.268 kg (5 lb) is insufficient. The following recommendations were made: reviewed weight tracking graph in Epic with patient.    Will fill out Forms - if I don't see it, message. Maternity leave 23, 12 weeks.     Tdap Influenza Covid   2022  Tdap (Adacel,Boostrix) 10/17/2022  2021-COVID-19 Vaccine 12+ (Pfizer)     2021-COVID-19 Vaccine 12+ (Pfizer)     2021-COVID-19 Vaccine 12+ (Pfizer)     10/17/2022-COVID-19 Vaccine Bivalent Booster 12+ (Pfizer)       Order Summary                                                      Encounter for supervision of other normal pregnancy in third trimester  -     Prenatal Vit-Fe Fumarate-FA (PRENATAL MULTIVITAMIN W/IRON) 27-0.8 MG tablet; Take 1 tablet by mouth daily        Completed by: Betty Bush M.D., [facility]. 2022 5:08 PM.  MDM: [facility] neighborhood: [place] [address], language: English, MDM: 15 minutes spent on the date of the encounter doing chart review, history and exam, documentation and further activities per the note.

## 2022-12-21 NOTE — TELEPHONE ENCOUNTER
Pt called asking for her FMLA paper status. Pt needs it done ASAP. Pt thinks it getting close to be overdue.    Pt wants form  1- faxed to the number on the form  2- email a copy to her ( told Pt I don't know if this will be possible)  3 - a call when it is done number on her file  4- if email is not possible, she wants a copy and can come to the clinic to .      Thank you.

## 2022-12-23 NOTE — TELEPHONE ENCOUNTER
I'm not sure what absence this is for. It's a return to work form, but she hasn't been absent? Please touch base with her to see what dates and for what.

## 2022-12-26 NOTE — TELEPHONE ENCOUNTER
RN attempted to call patient regarding the [employer] message and ' message below.    Patient did not answer. Left message to patient to call the clinic back.      If patient calls back, please relay Dr. Bush' message below and obtain more details regarding the return work form.             Neda Diaz RN  [facility]

## 2022-12-27 ENCOUNTER — MYC MEDICAL ADVICE (OUTPATIENT)
Dept: FAMILY MEDICINE | Facility: CLINIC | Age: 27
End: 2022-12-27

## 2022-12-27 NOTE — TELEPHONE ENCOUNTER
RN called patient regarding her mychart message.       Patient stated that both forms are for after giving the birth. Patient plans to take off 12 weeks. Patient needs the forms to be done before her appointment with Dr. Bush on 12/30/2022.      When Dr. Bush finished the form, please fax it both forms to [phone] and attention to Absence Pro.  Patient also requested a copy of the form to email it to her.         RN will route this encounter to  to review and advise.          Neda Diaz RN  [facility]

## 2022-12-28 NOTE — CONFIDENTIAL NOTE
Forms have been faxed to the number on the form, emailed to Caroline's email on file and placed in the urgent scan folder on the SOUTH.

## 2022-12-28 NOTE — CONFIDENTIAL NOTE
Form faxed to [employer], Caroline has been informed of this.     Placed into Urgent Scan folder on the SOUTH.

## 2023-01-10 ENCOUNTER — PRENATAL OFFICE VISIT (OUTPATIENT)
Dept: FAMILY MEDICINE | Facility: CLINIC | Age: 28
End: 2023-01-10
Payer: COMMERCIAL

## 2023-01-10 VITALS
HEIGHT: 61 IN | SYSTOLIC BLOOD PRESSURE: 101 MMHG | BODY MASS INDEX: 27.75 KG/M2 | RESPIRATION RATE: 17 BRPM | HEART RATE: 103 BPM | TEMPERATURE: 97.5 F | OXYGEN SATURATION: 98 % | WEIGHT: 147 LBS | DIASTOLIC BLOOD PRESSURE: 69 MMHG

## 2023-01-10 DIAGNOSIS — Z34.83 ENCOUNTER FOR SUPERVISION OF OTHER NORMAL PREGNANCY IN THIRD TRIMESTER: Primary | ICD-10-CM

## 2023-01-10 LAB — HGB BLD-MCNC: 9.5 G/DL (ref 11.7–15.7)

## 2023-01-10 PROCEDURE — 85018 HEMOGLOBIN: CPT | Performed by: FAMILY MEDICINE

## 2023-01-10 PROCEDURE — 99207 PR PRENATAL VISIT: CPT | Performed by: FAMILY MEDICINE

## 2023-01-10 PROCEDURE — 36415 COLL VENOUS BLD VENIPUNCTURE: CPT | Performed by: FAMILY MEDICINE

## 2023-01-10 NOTE — PROGRESS NOTES
"Subjective:    No problems.     OB ROS:   Headache: None.   Vision change: None.   Abnormal vaginal discharge: None.   Bleeding: None.   Fetal movement: Normal.     Objective:  /69 (BP Location: Left arm, Patient Position: Sitting, Cuff Size: Adult Regular)   Pulse 103   Temp 97.5  F (36.4  C) (Temporal)   Resp 17   Ht 1.549 m (5' 1\")   Wt 66.7 kg (147 lb)   LMP 2022 (Exact Date)   SpO2 98%   BMI 27.78 kg/m     Prepregnancy BMI: 26.47. Total weight gain: 3.175 kg (7 lb)   Exam: see flowsheet.  No visits with results within 1 Day(s) from this visit.   Latest known visit with results is:   Virtual Visit on 2022   Component Date Value Ref Range Status     Chlamydia trachomatis 2022 Negative  Negative Final    A negative result by transcription mediated amplification does not preclude the presence of C. trachomatis infection because results are dependent on proper and adequate collection, absence of inhibitors and sufficient rRNA to be detected.     Neisseria gonorrhoeae 2022 Negative  Negative Final    Negative for N. gonorrhoeae rRNA by transcription mediated amplification. A negative result by transcription mediated amplification does not preclude the presence of C. trachomatis infection because results are dependent on proper and adequate collection, absence of inhibitors and sufficient rRNA to be detected.     Trichomonas 2022 Absent  Absent Final     Yeast 2022 Absent  Absent Final     Clue Cells 2022 Absent  Absent Final     WBCs/high power field 2022 None  None Final     Fetal Fibronectin 2022 Negative  Negative Final    A negative fetal fibronectin result indicates a low risk for  birth.     Group B Strep PCR 2022 Negative  Negative Final    Presumed negative for Streptococcus agalactiae (Group B Streptococcus) or the number of organisms may be below the limit of detection of the assay.     Hemoglobin 2022 9.2 (L)  11.7 - " 15.7 g/dL Final     Treponema Antibody Total 2022 Nonreactive  Nonreactive Final     Glu Gest Screen 1hr 50g 2022 120  70 - 129 mg/dL Final     Fern Crystallization 2022 No ferning present  No ferning present Final      Assessment/Plan:    27 year old  at 34w1d.    Due to prepregnancy BMI of 26.47, weight gain of 3.175 kg (7 lb) is insufficient. The following recommendations were made: reviewed weight tracking graph in Epic with patient.      Future Appointments   Date Time Provider Department Center   2023 12:00 PM Betty Bush MD [facility]   2/3/2023 11:20 AM Betty Bush MD [facility]   2023 12:00 PM Betty Bush MD [facility]   2023  2:00 PM Betty Bush MD [facility]   2023  1:20 PM Betty Bush MD [facility]      Order Summary                                                      Encounter for supervision of other normal pregnancy in third trimester        Completed by: Betty Bush M.D., [facility]. 1/10/2023 11:48 AM.  MDM: CARLOS EDUARDO neighborhood: [place] [address], language: English, MDM: 15 minutes spent on the date of the encounter doing chart review, history and exam, documentation and further activities per the note.

## 2023-01-24 ENCOUNTER — PRENATAL OFFICE VISIT (OUTPATIENT)
Dept: FAMILY MEDICINE | Facility: CLINIC | Age: 28
End: 2023-01-24
Payer: COMMERCIAL

## 2023-01-24 VITALS
BODY MASS INDEX: 27.94 KG/M2 | SYSTOLIC BLOOD PRESSURE: 106 MMHG | HEIGHT: 61 IN | RESPIRATION RATE: 18 BRPM | WEIGHT: 148 LBS | DIASTOLIC BLOOD PRESSURE: 71 MMHG | OXYGEN SATURATION: 98 % | HEART RATE: 109 BPM

## 2023-01-24 DIAGNOSIS — Z34.80 SUPERVISION OF OTHER NORMAL PREGNANCY, ANTEPARTUM: ICD-10-CM

## 2023-01-24 DIAGNOSIS — Z34.83 ENCOUNTER FOR SUPERVISION OF OTHER NORMAL PREGNANCY IN THIRD TRIMESTER: Primary | ICD-10-CM

## 2023-01-24 PROCEDURE — 87653 STREP B DNA AMP PROBE: CPT | Performed by: FAMILY MEDICINE

## 2023-01-24 PROCEDURE — 99207 PR PRENATAL VISIT: CPT | Performed by: FAMILY MEDICINE

## 2023-01-24 RX ORDER — FERROUS GLUCONATE 324(38)MG
324 TABLET ORAL EVERY OTHER DAY
Qty: 100 TABLET | Refills: 3 | Status: SHIPPED | OUTPATIENT
Start: 2023-01-24 | End: 2023-05-01

## 2023-01-24 NOTE — PROGRESS NOTES
"Subjective:  No problems    OB ROS:   Headache: None.   Vision change: None.   Abnormal vaginal discharge: None.   Bleeding: None.   Fetal movement: Normal.     Objective:  /71 (BP Location: Left arm, Patient Position: Sitting, Cuff Size: Adult Regular)   Pulse 109   Resp 18   Ht 1.549 m (5' 1\")   Wt 67.1 kg (148 lb)   LMP 2022 (Exact Date)   SpO2 98%   BMI 27.96 kg/m     Prepregnancy BMI: 26.47. Total weight gain: 3.629 kg (8 lb)   Exam: see flowsheet.  Prenatal Office Visit on 2023   Component Date Value Ref Range Status     Group B Strep PCR 2023 Negative  Negative Final    Presumed negative for Streptococcus agalactiae (Group B Streptococcus) or the number of organisms may be below the limit of detection of the assay.      Assessment/Plan:    27 year old  at 36w1d.    Due to prepregnancy BMI of 26.47, weight gain of 3.629 kg (8 lb) is appropriate for prepregnancy BMI. The following recommendations were made: reviewed weight tracking graph in Epic with patient      Future Appointments   Date Time Provider Department Center   2023 12:00 PM Betty Bush MD [facility]   2/3/2023 11:20 AM Betty Bush MD [facility]   2023  2:00 PM Betty Bush MD [facility]   2023  1:20 PM Betty Bush MD [facility]      Order Summary                                                      Encounter for supervision of other normal pregnancy in third trimester  -     Group B strep PCR    Supervision of other normal pregnancy, antepartum  -     ferrous gluconate (FERGON) 324 (38 Fe) MG tablet; Take 1 tablet (324 mg) by mouth every other day        Completed by: Betty Bush M.D., [facility]. 2023 11:45 AM.  MDM: CARLOS EDUARDO neighborhood: [place] [address], language: English, MDM: 15 minutes spent on the date of the encounter doing chart review, history and exam, documentation and further activities per the note.  "

## 2023-01-25 LAB — GP B STREP DNA SPEC QL NAA+PROBE: NEGATIVE

## 2023-02-03 ENCOUNTER — PRENATAL OFFICE VISIT (OUTPATIENT)
Dept: FAMILY MEDICINE | Facility: CLINIC | Age: 28
End: 2023-02-03
Payer: COMMERCIAL

## 2023-02-03 VITALS
TEMPERATURE: 97.7 F | DIASTOLIC BLOOD PRESSURE: 73 MMHG | OXYGEN SATURATION: 97 % | HEART RATE: 93 BPM | WEIGHT: 148 LBS | SYSTOLIC BLOOD PRESSURE: 117 MMHG | BODY MASS INDEX: 27.96 KG/M2

## 2023-02-03 DIAGNOSIS — Z34.83 ENCOUNTER FOR SUPERVISION OF OTHER NORMAL PREGNANCY IN THIRD TRIMESTER: Primary | ICD-10-CM

## 2023-02-03 PROCEDURE — 99207 PR PRENATAL VISIT: CPT | Performed by: FAMILY MEDICINE

## 2023-02-03 NOTE — PROGRESS NOTES
Subjective:  No problems      OB ROS:   Headache: None.   Vision change: None.   Abnormal vaginal discharge: None.   Bleeding: None.   Fetal movement: Normal.     Objective:  /73 (BP Location: Right arm, Patient Position: Sitting, Cuff Size: Adult Regular)   Pulse 93   Temp 97.7  F (36.5  C) (Oral)   Wt 67.1 kg (148 lb)   LMP 2022 (Exact Date)   SpO2 97%   BMI 27.96 kg/m     Prepregnancy BMI: 26.47. Total weight gain: 3.629 kg (8 lb)   Exam: see flowsheet.  No visits with results within 1 Day(s) from this visit.   Latest known visit with results is:   Prenatal Office Visit on 2023   Component Date Value Ref Range Status     Group B Strep PCR 2023 Negative  Negative Final    Presumed negative for Streptococcus agalactiae (Group B Streptococcus) or the number of organisms may be below the limit of detection of the assay.      Assessment/Plan:    27 year old  at 37w4d.    Due to prepregnancy BMI of 26.47, weight gain of 3.629 kg (8 lb) is insufficient. The following recommendations were made: reviewed weight tracking graph in Epic with patient. Discussed nutition.       Future Appointments   Date Time Provider Department Center   2023  1:20 PM [facility]      Order Summary                                                      Encounter for supervision of other normal pregnancy in third trimester        Completed by: Betty Bush M.D., [facility]. 2/3/2023 11:42 AM.  MDM: SDOH neighborhood: [place] [address], language: English, MDM: 15 minutes spent on the date of the encounter doing chart review, history and exam, documentation and further activities per the note.

## 2023-02-09 ENCOUNTER — PRENATAL OFFICE VISIT (OUTPATIENT)
Dept: FAMILY MEDICINE | Facility: CLINIC | Age: 28
End: 2023-02-09
Payer: COMMERCIAL

## 2023-02-09 VITALS
SYSTOLIC BLOOD PRESSURE: 101 MMHG | HEART RATE: 80 BPM | RESPIRATION RATE: 18 BRPM | WEIGHT: 149 LBS | HEIGHT: 61 IN | BODY MASS INDEX: 28.13 KG/M2 | OXYGEN SATURATION: 98 % | DIASTOLIC BLOOD PRESSURE: 68 MMHG

## 2023-02-09 DIAGNOSIS — Z34.83 ENCOUNTER FOR SUPERVISION OF OTHER NORMAL PREGNANCY IN THIRD TRIMESTER: Primary | ICD-10-CM

## 2023-02-09 PROCEDURE — 99207 PR PRENATAL VISIT: CPT | Performed by: FAMILY MEDICINE

## 2023-02-09 NOTE — PROGRESS NOTES
"Subjective:  No problems    OB ROS:   Headache: None.   Vision change: None.   Abnormal vaginal discharge: None.   Bleeding: None.   Fetal movement: Normal.     Objective:  /68 (BP Location: Right arm, Patient Position: Sitting, Cuff Size: Adult Regular)   Pulse 80   Resp 18   Ht 1.54 m (5' 0.63\")   Wt 67.6 kg (149 lb)   LMP 2022 (Exact Date)   SpO2 98%   Breastfeeding Unknown   BMI 28.50 kg/m     Prepregnancy BMI: 26.78. Total weight gain: 4.082 kg (9 lb)   Exam: see flowsheet.  No visits with results within 1 Day(s) from this visit.   Latest known visit with results is:   Prenatal Office Visit on 2023   Component Date Value Ref Range Status     Group B Strep PCR 2023 Negative  Negative Final    Presumed negative for Streptococcus agalactiae (Group B Streptococcus) or the number of organisms may be below the limit of detection of the assay.      Assessment/Plan:    27 year old  at 38w3d.    Due to prepregnancy BMI of 26.78, weight gain of 4.082 kg (9 lb) is appropriate for prepregnancy BMI. The following recommendations were made: reviewed weight tracking graph in Epic with patient.      Future Appointments   Date Time Provider Department Center   2023  5:40 PM [facility]   2023 11:00 AM [facility]   [facility]20 PM [facility]      Order Summary                                                      There are no diagnoses linked to this encounter.    Completed by: Betty Bush M.D., [facility]. 2023 11:36 AM.  MDM: SDOH neighborhood: [place] [address], language: English, MDM: 15 minutes spent on the date of the encounter doing chart review, history and exam, documentation and further activities per the note.  "

## 2023-02-16 ENCOUNTER — PRENATAL OFFICE VISIT (OUTPATIENT)
Dept: FAMILY MEDICINE | Facility: CLINIC | Age: 28
End: 2023-02-16
Payer: COMMERCIAL

## 2023-02-16 VITALS
RESPIRATION RATE: 16 BRPM | HEART RATE: 101 BPM | SYSTOLIC BLOOD PRESSURE: 126 MMHG | TEMPERATURE: 98.4 F | DIASTOLIC BLOOD PRESSURE: 72 MMHG | BODY MASS INDEX: 29.45 KG/M2 | WEIGHT: 154 LBS | OXYGEN SATURATION: 98 %

## 2023-02-16 DIAGNOSIS — Z34.83 ENCOUNTER FOR SUPERVISION OF OTHER NORMAL PREGNANCY IN THIRD TRIMESTER: Primary | ICD-10-CM

## 2023-02-16 PROCEDURE — 99207 PR PRENATAL VISIT: CPT | Performed by: FAMILY MEDICINE

## 2023-02-16 NOTE — PROGRESS NOTES
Subjective:  No problems    OB ROS:   Headache: None.   Vision change: None.   Abnormal vaginal discharge: None.   Bleeding: None.   Fetal movement: Normal.     Objective:  /72 (BP Location: Right arm, Patient Position: Sitting, Cuff Size: Adult Regular)   Pulse 101   Temp 98.4  F (36.9  C) (Temporal)   Resp 16   Wt 69.9 kg (154 lb)   LMP 2022 (Exact Date)   SpO2 98%   Breastfeeding No   BMI 29.45 kg/m     Prepregnancy BMI: 24.58. Total weight gain: 10.9 kg (24 lb)   Exam: see flowsheet.  No visits with results within 1 Day(s) from this visit.   Latest known visit with results is:   Prenatal Office Visit on 2023   Component Date Value Ref Range Status     Group B Strep PCR 2023 Negative  Negative Final    Presumed negative for Streptococcus agalactiae (Group B Streptococcus) or the number of organisms may be below the limit of detection of the assay.      Assessment/Plan:    27 year old  at 40w0d.    Due to prepregnancy BMI of 24.58, weight gain of 10.9 kg (24 lb) is appropriate for prepregnancy BMI. The following recommendations were made: reviewed weight tracking graph in Epic with patient.      Future Appointments   Date Time Provider Department Center   3/10/2023  9:00 AM Betty Bush MD [facility]      Order Summary                                                      Encounter for supervision of other normal pregnancy in third trimester        Completed by: Betty Bush M.D., [facility]. 2023 2:19 PM.  MDM: CARLOS EDUARDO neighborhood: [place] [address], language: English, MDM: 15 minutes spent on the date of the encounter doing chart review, history and exam, documentation and further activities per the note.

## 2023-02-17 ENCOUNTER — MEDICAL CORRESPONDENCE (OUTPATIENT)
Dept: HEALTH INFORMATION MANAGEMENT | Facility: CLINIC | Age: 28
End: 2023-02-17

## 2023-02-17 ENCOUNTER — TELEPHONE (OUTPATIENT)
Dept: FAMILY MEDICINE | Facility: CLINIC | Age: 28
End: 2023-02-17
Payer: COMMERCIAL

## 2023-02-17 NOTE — TELEPHONE ENCOUNTER
"Form for postpartum recovery garment size medium, from \"[employer]\" was completed.  Completed form is in my out basket.  Please fill in demographic information and fax.  "

## 2023-02-19 ENCOUNTER — HOSPITAL ENCOUNTER (INPATIENT)
Facility: HOSPITAL | Age: 28
LOS: 2 days | Discharge: HOME | End: 2023-02-21
Attending: FAMILY MEDICINE | Admitting: FAMILY MEDICINE
Payer: COMMERCIAL

## 2023-02-19 PROBLEM — Z37.9 NORMAL LABOR: Status: ACTIVE | Noted: 2023-02-19

## 2023-02-19 LAB
ABO/RH(D): NORMAL
ANTIBODY SCREEN: NEGATIVE
BASOPHILS # BLD AUTO: 0.1 10E3/UL (ref 0–0.2)
BASOPHILS NFR BLD AUTO: 0 %
EOSINOPHIL # BLD AUTO: 0.3 10E3/UL (ref 0–0.7)
EOSINOPHIL NFR BLD AUTO: 2 %
ERYTHROCYTE [DISTWIDTH] IN BLOOD BY AUTOMATED COUNT: 17.2 % (ref 10–15)
HCT VFR BLD AUTO: 34.4 % (ref 35–47)
HGB BLD-MCNC: 10.9 G/DL (ref 11.7–15.7)
IMM GRANULOCYTES # BLD: 0.3 10E3/UL
IMM GRANULOCYTES NFR BLD: 2 %
LYMPHOCYTES # BLD AUTO: 2.2 10E3/UL (ref 0.8–5.3)
LYMPHOCYTES NFR BLD AUTO: 15 %
MCH RBC QN AUTO: 22.1 PG (ref 26.5–33)
MCHC RBC AUTO-ENTMCNC: 31.7 G/DL (ref 31.5–36.5)
MCV RBC AUTO: 70 FL (ref 78–100)
MONOCYTES # BLD AUTO: 1.2 10E3/UL (ref 0–1.3)
MONOCYTES NFR BLD AUTO: 8 %
NEUTROPHILS # BLD AUTO: 11.2 10E3/UL (ref 1.6–8.3)
NEUTROPHILS NFR BLD AUTO: 73 %
NRBC # BLD AUTO: 0.1 10E3/UL
NRBC BLD AUTO-RTO: 1 /100
PLATELET # BLD AUTO: 206 10E3/UL (ref 150–450)
RBC # BLD AUTO: 4.93 10E6/UL (ref 3.8–5.2)
RUPTURE OF FETAL MEMBRANES BY ROM PLUS: POSITIVE
SPECIMEN EXPIRATION DATE: NORMAL
WBC # BLD AUTO: 15 10E3/UL (ref 4–11)

## 2023-02-19 PROCEDURE — 258N000003 HC RX IP 258 OP 636: Performed by: FAMILY MEDICINE

## 2023-02-19 PROCEDURE — 250N000013 HC RX MED GY IP 250 OP 250 PS 637: Performed by: FAMILY MEDICINE

## 2023-02-19 PROCEDURE — 120N000001 HC R&B MED SURG/OB

## 2023-02-19 PROCEDURE — 36415 COLL VENOUS BLD VENIPUNCTURE: CPT | Performed by: FAMILY MEDICINE

## 2023-02-19 PROCEDURE — 250N000009 HC RX 250: Performed by: FAMILY MEDICINE

## 2023-02-19 PROCEDURE — 86850 RBC ANTIBODY SCREEN: CPT | Performed by: FAMILY MEDICINE

## 2023-02-19 PROCEDURE — 85025 COMPLETE CBC W/AUTO DIFF WBC: CPT | Performed by: FAMILY MEDICINE

## 2023-02-19 PROCEDURE — 86780 TREPONEMA PALLIDUM: CPT | Performed by: FAMILY MEDICINE

## 2023-02-19 PROCEDURE — 84112 EVAL AMNIOTIC FLUID PROTEIN: CPT | Performed by: FAMILY MEDICINE

## 2023-02-19 PROCEDURE — 86901 BLOOD TYPING SEROLOGIC RH(D): CPT | Performed by: FAMILY MEDICINE

## 2023-02-19 RX ORDER — NALOXONE HYDROCHLORIDE 0.4 MG/ML
0.4 INJECTION, SOLUTION INTRAMUSCULAR; INTRAVENOUS; SUBCUTANEOUS
Status: DISCONTINUED | OUTPATIENT
Start: 2023-02-19 | End: 2023-02-20 | Stop reason: HOSPADM

## 2023-02-19 RX ORDER — LIDOCAINE 40 MG/G
CREAM TOPICAL
Status: DISCONTINUED | OUTPATIENT
Start: 2023-02-19 | End: 2023-02-19 | Stop reason: HOSPADM

## 2023-02-19 RX ORDER — METOCLOPRAMIDE HYDROCHLORIDE 5 MG/ML
10 INJECTION INTRAMUSCULAR; INTRAVENOUS EVERY 6 HOURS PRN
Status: DISCONTINUED | OUTPATIENT
Start: 2023-02-19 | End: 2023-02-20 | Stop reason: HOSPADM

## 2023-02-19 RX ORDER — TERBUTALINE SULFATE 1 MG/ML
0.25 INJECTION, SOLUTION SUBCUTANEOUS
Status: DISCONTINUED | OUTPATIENT
Start: 2023-02-19 | End: 2023-02-20 | Stop reason: HOSPADM

## 2023-02-19 RX ORDER — OXYTOCIN 10 [USP'U]/ML
10 INJECTION, SOLUTION INTRAMUSCULAR; INTRAVENOUS
Status: DISCONTINUED | OUTPATIENT
Start: 2023-02-19 | End: 2023-02-21 | Stop reason: HOSPADM

## 2023-02-19 RX ORDER — MISOPROSTOL 200 UG/1
400 TABLET ORAL
Status: DISCONTINUED | OUTPATIENT
Start: 2023-02-19 | End: 2023-02-20 | Stop reason: HOSPADM

## 2023-02-19 RX ORDER — ACETAMINOPHEN 325 MG/1
650 TABLET ORAL EVERY 4 HOURS PRN
Status: DISCONTINUED | OUTPATIENT
Start: 2023-02-19 | End: 2023-02-20 | Stop reason: HOSPADM

## 2023-02-19 RX ORDER — MISOPROSTOL 200 UG/1
800 TABLET ORAL
Status: DISCONTINUED | OUTPATIENT
Start: 2023-02-19 | End: 2023-02-20 | Stop reason: HOSPADM

## 2023-02-19 RX ORDER — OXYTOCIN/0.9 % SODIUM CHLORIDE 30/500 ML
340 PLASTIC BAG, INJECTION (ML) INTRAVENOUS CONTINUOUS PRN
Status: DISCONTINUED | OUTPATIENT
Start: 2023-02-19 | End: 2023-02-20 | Stop reason: HOSPADM

## 2023-02-19 RX ORDER — LIDOCAINE 40 MG/G
CREAM TOPICAL
Status: DISCONTINUED | OUTPATIENT
Start: 2023-02-19 | End: 2023-02-20 | Stop reason: HOSPADM

## 2023-02-19 RX ORDER — SODIUM CHLORIDE, SODIUM LACTATE, POTASSIUM CHLORIDE, CALCIUM CHLORIDE 600; 310; 30; 20 MG/100ML; MG/100ML; MG/100ML; MG/100ML
INJECTION, SOLUTION INTRAVENOUS CONTINUOUS PRN
Status: DISCONTINUED | OUTPATIENT
Start: 2023-02-19 | End: 2023-02-20 | Stop reason: HOSPADM

## 2023-02-19 RX ORDER — CALCIUM CARBONATE 500 MG/1
500 TABLET, CHEWABLE ORAL 3 TIMES DAILY PRN
Status: DISCONTINUED | OUTPATIENT
Start: 2023-02-19 | End: 2023-02-21 | Stop reason: HOSPADM

## 2023-02-19 RX ORDER — OXYTOCIN 10 [USP'U]/ML
10 INJECTION, SOLUTION INTRAMUSCULAR; INTRAVENOUS
Status: DISCONTINUED | OUTPATIENT
Start: 2023-02-19 | End: 2023-02-20 | Stop reason: HOSPADM

## 2023-02-19 RX ORDER — SODIUM CHLORIDE, SODIUM LACTATE, POTASSIUM CHLORIDE, CALCIUM CHLORIDE 600; 310; 30; 20 MG/100ML; MG/100ML; MG/100ML; MG/100ML
INJECTION, SOLUTION INTRAVENOUS CONTINUOUS
Status: DISCONTINUED | OUTPATIENT
Start: 2023-02-19 | End: 2023-02-20 | Stop reason: HOSPADM

## 2023-02-19 RX ORDER — PROCHLORPERAZINE MALEATE 10 MG
10 TABLET ORAL EVERY 6 HOURS PRN
Status: DISCONTINUED | OUTPATIENT
Start: 2023-02-19 | End: 2023-02-20 | Stop reason: HOSPADM

## 2023-02-19 RX ORDER — NALOXONE HYDROCHLORIDE 0.4 MG/ML
0.2 INJECTION, SOLUTION INTRAMUSCULAR; INTRAVENOUS; SUBCUTANEOUS
Status: DISCONTINUED | OUTPATIENT
Start: 2023-02-19 | End: 2023-02-20 | Stop reason: HOSPADM

## 2023-02-19 RX ORDER — FENTANYL CITRATE 50 UG/ML
100 INJECTION, SOLUTION INTRAMUSCULAR; INTRAVENOUS
Status: DISCONTINUED | OUTPATIENT
Start: 2023-02-19 | End: 2023-02-20 | Stop reason: HOSPADM

## 2023-02-19 RX ORDER — PROCHLORPERAZINE 25 MG
25 SUPPOSITORY, RECTAL RECTAL EVERY 12 HOURS PRN
Status: DISCONTINUED | OUTPATIENT
Start: 2023-02-19 | End: 2023-02-20 | Stop reason: HOSPADM

## 2023-02-19 RX ORDER — KETOROLAC TROMETHAMINE 30 MG/ML
30 INJECTION, SOLUTION INTRAMUSCULAR; INTRAVENOUS
Status: DISCONTINUED | OUTPATIENT
Start: 2023-02-19 | End: 2023-02-21 | Stop reason: HOSPADM

## 2023-02-19 RX ORDER — CITRIC ACID/SODIUM CITRATE 334-500MG
30 SOLUTION, ORAL ORAL
Status: DISCONTINUED | OUTPATIENT
Start: 2023-02-19 | End: 2023-02-20 | Stop reason: HOSPADM

## 2023-02-19 RX ORDER — ONDANSETRON 2 MG/ML
4 INJECTION INTRAMUSCULAR; INTRAVENOUS EVERY 6 HOURS PRN
Status: DISCONTINUED | OUTPATIENT
Start: 2023-02-19 | End: 2023-02-20 | Stop reason: HOSPADM

## 2023-02-19 RX ORDER — IBUPROFEN 800 MG/1
800 TABLET, FILM COATED ORAL
Status: DISCONTINUED | OUTPATIENT
Start: 2023-02-19 | End: 2023-02-21 | Stop reason: HOSPADM

## 2023-02-19 RX ORDER — ONDANSETRON 4 MG/1
4 TABLET, ORALLY DISINTEGRATING ORAL EVERY 6 HOURS PRN
Status: DISCONTINUED | OUTPATIENT
Start: 2023-02-19 | End: 2023-02-20 | Stop reason: HOSPADM

## 2023-02-19 RX ORDER — OXYTOCIN/0.9 % SODIUM CHLORIDE 30/500 ML
1-24 PLASTIC BAG, INJECTION (ML) INTRAVENOUS CONTINUOUS
Status: DISCONTINUED | OUTPATIENT
Start: 2023-02-19 | End: 2023-02-20 | Stop reason: HOSPADM

## 2023-02-19 RX ORDER — METHYLERGONOVINE MALEATE 0.2 MG/ML
200 INJECTION INTRAVENOUS
Status: DISCONTINUED | OUTPATIENT
Start: 2023-02-19 | End: 2023-02-20 | Stop reason: HOSPADM

## 2023-02-19 RX ORDER — OXYTOCIN/0.9 % SODIUM CHLORIDE 30/500 ML
100-340 PLASTIC BAG, INJECTION (ML) INTRAVENOUS CONTINUOUS PRN
Status: DISCONTINUED | OUTPATIENT
Start: 2023-02-19 | End: 2023-02-21 | Stop reason: HOSPADM

## 2023-02-19 RX ORDER — MISOPROSTOL 100 UG/1
25 TABLET ORAL
Status: DISCONTINUED | OUTPATIENT
Start: 2023-02-19 | End: 2023-02-20 | Stop reason: HOSPADM

## 2023-02-19 RX ORDER — METOCLOPRAMIDE 10 MG/1
10 TABLET ORAL EVERY 6 HOURS PRN
Status: DISCONTINUED | OUTPATIENT
Start: 2023-02-19 | End: 2023-02-20 | Stop reason: HOSPADM

## 2023-02-19 RX ORDER — CARBOPROST TROMETHAMINE 250 UG/ML
250 INJECTION, SOLUTION INTRAMUSCULAR
Status: DISCONTINUED | OUTPATIENT
Start: 2023-02-19 | End: 2023-02-20 | Stop reason: HOSPADM

## 2023-02-19 RX ORDER — DOCUSATE SODIUM 100 MG/1
100 CAPSULE, LIQUID FILLED ORAL 2 TIMES DAILY
Status: DISCONTINUED | OUTPATIENT
Start: 2023-02-19 | End: 2023-02-21 | Stop reason: HOSPADM

## 2023-02-19 RX ADMIN — CALCIUM CARBONATE (ANTACID) CHEW TAB 500 MG 500 MG: 500 CHEW TAB at 21:36

## 2023-02-19 RX ADMIN — MISOPROSTOL 25 MCG: 100 TABLET ORAL at 17:28

## 2023-02-19 RX ADMIN — MISOPROSTOL 25 MCG: 100 TABLET ORAL at 19:31

## 2023-02-19 RX ADMIN — MISOPROSTOL 25 MCG: 100 TABLET ORAL at 21:32

## 2023-02-19 RX ADMIN — DOCUSATE SODIUM 100 MG: 100 CAPSULE, LIQUID FILLED ORAL at 21:38

## 2023-02-19 RX ADMIN — SODIUM CHLORIDE, POTASSIUM CHLORIDE, SODIUM LACTATE AND CALCIUM CHLORIDE: 600; 310; 30; 20 INJECTION, SOLUTION INTRAVENOUS at 23:09

## 2023-02-19 RX ADMIN — Medication 2 MILLI-UNITS/MIN: at 23:21

## 2023-02-19 ASSESSMENT — ACTIVITIES OF DAILY LIVING (ADL)
WALKING_OR_CLIMBING_STAIRS_DIFFICULTY: NO
DOING_ERRANDS_INDEPENDENTLY_DIFFICULTY: NO
CONCENTRATING,_REMEMBERING_OR_MAKING_DECISIONS_DIFFICULTY: NO
CHANGE_IN_FUNCTIONAL_STATUS_SINCE_ONSET_OF_CURRENT_ILLNESS/INJURY: NO
DRESSING/BATHING_DIFFICULTY: NO
ADLS_ACUITY_SCORE: 18
ADLS_ACUITY_SCORE: 18
DIFFICULTY_EATING/SWALLOWING: NO
TOILETING_ISSUES: NO
ADLS_ACUITY_SCORE: 18
FALL_HISTORY_WITHIN_LAST_SIX_MONTHS: NO
WEAR_GLASSES_OR_BLIND: NO
ADLS_ACUITY_SCORE: 18
ADLS_ACUITY_SCORE: 35

## 2023-02-19 NOTE — PROGRESS NOTES
"Pt presents to [facility] c/o \"leaking fluid\", some mild ctns. Pt is a  39.6 weeks. Admitted to room 31. Monitors applied, vss. Collected and sent ROM+.  "

## 2023-02-20 ENCOUNTER — ANESTHESIA EVENT (OUTPATIENT)
Dept: OBGYN | Facility: HOSPITAL | Age: 28
End: 2023-02-20
Payer: COMMERCIAL

## 2023-02-20 ENCOUNTER — ANESTHESIA (OUTPATIENT)
Dept: OBGYN | Facility: HOSPITAL | Age: 28
End: 2023-02-20
Payer: COMMERCIAL

## 2023-02-20 LAB — T PALLIDUM AB SER QL: NONREACTIVE

## 2023-02-20 PROCEDURE — 258N000003 HC RX IP 258 OP 636: Performed by: FAMILY MEDICINE

## 2023-02-20 PROCEDURE — 250N000011 HC RX IP 250 OP 636: Performed by: ANESTHESIOLOGY

## 2023-02-20 PROCEDURE — 250N000011 HC RX IP 250 OP 636: Performed by: FAMILY MEDICINE

## 2023-02-20 PROCEDURE — 722N000001 HC LABOR CARE VAGINAL DELIVERY SINGLE

## 2023-02-20 PROCEDURE — 3E0R3BZ INTRODUCTION OF ANESTHETIC AGENT INTO SPINAL CANAL, PERCUTANEOUS APPROACH: ICD-10-PCS | Performed by: ANESTHESIOLOGY

## 2023-02-20 PROCEDURE — 00HU33Z INSERTION OF INFUSION DEVICE INTO SPINAL CANAL, PERCUTANEOUS APPROACH: ICD-10-PCS | Performed by: ANESTHESIOLOGY

## 2023-02-20 PROCEDURE — 120N000001 HC R&B MED SURG/OB

## 2023-02-20 PROCEDURE — 59400 OBSTETRICAL CARE: CPT | Performed by: FAMILY MEDICINE

## 2023-02-20 PROCEDURE — 10907ZC DRAINAGE OF AMNIOTIC FLUID, THERAPEUTIC FROM PRODUCTS OF CONCEPTION, VIA NATURAL OR ARTIFICIAL OPENING: ICD-10-PCS | Performed by: FAMILY MEDICINE

## 2023-02-20 PROCEDURE — 250N000009 HC RX 250: Performed by: FAMILY MEDICINE

## 2023-02-20 PROCEDURE — 250N000009 HC RX 250: Performed by: ANESTHESIOLOGY

## 2023-02-20 PROCEDURE — 250N000013 HC RX MED GY IP 250 OP 250 PS 637: Performed by: FAMILY MEDICINE

## 2023-02-20 PROCEDURE — 370N000003 HC ANESTHESIA WARD SERVICE

## 2023-02-20 RX ORDER — HYDROCORTISONE 25 MG/G
CREAM TOPICAL 3 TIMES DAILY PRN
Status: DISCONTINUED | OUTPATIENT
Start: 2023-02-20 | End: 2023-02-21 | Stop reason: HOSPADM

## 2023-02-20 RX ORDER — MISOPROSTOL 200 UG/1
800 TABLET ORAL
Status: DISCONTINUED | OUTPATIENT
Start: 2023-02-20 | End: 2023-02-21 | Stop reason: HOSPADM

## 2023-02-20 RX ORDER — NALBUPHINE HYDROCHLORIDE 10 MG/ML
2.5-5 INJECTION, SOLUTION INTRAMUSCULAR; INTRAVENOUS; SUBCUTANEOUS EVERY 6 HOURS PRN
Status: DISCONTINUED | OUTPATIENT
Start: 2023-02-20 | End: 2023-02-21 | Stop reason: HOSPADM

## 2023-02-20 RX ORDER — ACETAMINOPHEN 325 MG/1
650 TABLET ORAL EVERY 4 HOURS PRN
Status: DISCONTINUED | OUTPATIENT
Start: 2023-02-20 | End: 2023-02-21 | Stop reason: HOSPADM

## 2023-02-20 RX ORDER — BISACODYL 10 MG
10 SUPPOSITORY, RECTAL RECTAL DAILY PRN
Status: DISCONTINUED | OUTPATIENT
Start: 2023-02-20 | End: 2023-02-21 | Stop reason: HOSPADM

## 2023-02-20 RX ORDER — FENTANYL CITRATE-0.9 % NACL/PF 10 MCG/ML
100 PLASTIC BAG, INJECTION (ML) INTRAVENOUS EVERY 5 MIN PRN
Status: DISCONTINUED | OUTPATIENT
Start: 2023-02-20 | End: 2023-02-20 | Stop reason: HOSPADM

## 2023-02-20 RX ORDER — OXYTOCIN/0.9 % SODIUM CHLORIDE 30/500 ML
340 PLASTIC BAG, INJECTION (ML) INTRAVENOUS CONTINUOUS PRN
Status: DISCONTINUED | OUTPATIENT
Start: 2023-02-20 | End: 2023-02-21 | Stop reason: HOSPADM

## 2023-02-20 RX ORDER — LIDOCAINE HYDROCHLORIDE AND EPINEPHRINE 15; 5 MG/ML; UG/ML
INJECTION, SOLUTION EPIDURAL PRN
Status: DISCONTINUED | OUTPATIENT
Start: 2023-02-20 | End: 2023-02-20

## 2023-02-20 RX ORDER — IBUPROFEN 800 MG/1
800 TABLET, FILM COATED ORAL EVERY 6 HOURS PRN
Status: DISCONTINUED | OUTPATIENT
Start: 2023-02-20 | End: 2023-02-21 | Stop reason: HOSPADM

## 2023-02-20 RX ORDER — ONDANSETRON 2 MG/ML
4 INJECTION INTRAMUSCULAR; INTRAVENOUS EVERY 6 HOURS PRN
Status: DISCONTINUED | OUTPATIENT
Start: 2023-02-20 | End: 2023-02-20 | Stop reason: HOSPADM

## 2023-02-20 RX ORDER — MISOPROSTOL 200 UG/1
400 TABLET ORAL
Status: DISCONTINUED | OUTPATIENT
Start: 2023-02-20 | End: 2023-02-21 | Stop reason: HOSPADM

## 2023-02-20 RX ORDER — OXYTOCIN 10 [USP'U]/ML
10 INJECTION, SOLUTION INTRAMUSCULAR; INTRAVENOUS
Status: DISCONTINUED | OUTPATIENT
Start: 2023-02-20 | End: 2023-02-21 | Stop reason: HOSPADM

## 2023-02-20 RX ORDER — ONDANSETRON 4 MG/1
4 TABLET, ORALLY DISINTEGRATING ORAL EVERY 6 HOURS PRN
Status: DISCONTINUED | OUTPATIENT
Start: 2023-02-20 | End: 2023-02-20 | Stop reason: HOSPADM

## 2023-02-20 RX ORDER — DOCUSATE SODIUM 100 MG/1
100 CAPSULE, LIQUID FILLED ORAL DAILY
Status: DISCONTINUED | OUTPATIENT
Start: 2023-02-20 | End: 2023-02-21 | Stop reason: HOSPADM

## 2023-02-20 RX ORDER — MODIFIED LANOLIN
OINTMENT (GRAM) TOPICAL
Status: DISCONTINUED | OUTPATIENT
Start: 2023-02-20 | End: 2023-02-21 | Stop reason: HOSPADM

## 2023-02-20 RX ORDER — FENTANYL/ROPIVACAINE/NS/PF 2MCG/ML-.1
PLASTIC BAG, INJECTION (ML) EPIDURAL
Status: DISCONTINUED | OUTPATIENT
Start: 2023-02-20 | End: 2023-02-20 | Stop reason: HOSPADM

## 2023-02-20 RX ORDER — CARBOPROST TROMETHAMINE 250 UG/ML
250 INJECTION, SOLUTION INTRAMUSCULAR
Status: DISCONTINUED | OUTPATIENT
Start: 2023-02-20 | End: 2023-02-21 | Stop reason: HOSPADM

## 2023-02-20 RX ORDER — METHYLERGONOVINE MALEATE 0.2 MG/ML
200 INJECTION INTRAVENOUS
Status: DISCONTINUED | OUTPATIENT
Start: 2023-02-20 | End: 2023-02-21 | Stop reason: HOSPADM

## 2023-02-20 RX ADMIN — SODIUM CHLORIDE, POTASSIUM CHLORIDE, SODIUM LACTATE AND CALCIUM CHLORIDE: 600; 310; 30; 20 INJECTION, SOLUTION INTRAVENOUS at 06:35

## 2023-02-20 RX ADMIN — Medication 340 ML/HR: at 07:36

## 2023-02-20 RX ADMIN — BENZOCAINE AND LEVOMENTHOL: 200; 5 SPRAY TOPICAL at 09:33

## 2023-02-20 RX ADMIN — DOCUSATE SODIUM 100 MG: 100 CAPSULE, LIQUID FILLED ORAL at 09:33

## 2023-02-20 RX ADMIN — LIDOCAINE HYDROCHLORIDE,EPINEPHRINE BITARTRATE 3 ML: 15; .005 INJECTION, SOLUTION EPIDURAL; INFILTRATION; INTRACAUDAL; PERINEURAL at 05:39

## 2023-02-20 RX ADMIN — IBUPROFEN 800 MG: 800 TABLET ORAL at 20:11

## 2023-02-20 RX ADMIN — CALCIUM CARBONATE (ANTACID) CHEW TAB 500 MG 500 MG: 500 CHEW TAB at 04:39

## 2023-02-20 RX ADMIN — ACETAMINOPHEN 650 MG: 325 TABLET ORAL at 16:21

## 2023-02-20 RX ADMIN — LIDOCAINE HYDROCHLORIDE 4 ML: 20 INJECTION, SOLUTION EPIDURAL; INFILTRATION; INTRACAUDAL; PERINEURAL at 05:43

## 2023-02-20 RX ADMIN — Medication: at 05:36

## 2023-02-20 RX ADMIN — FENTANYL CITRATE 100 MCG: 50 INJECTION, SOLUTION INTRAMUSCULAR; INTRAVENOUS at 04:06

## 2023-02-20 RX ADMIN — WITCH HAZEL: 500 SOLUTION RECTAL; TOPICAL at 09:33

## 2023-02-20 RX ADMIN — KETOROLAC TROMETHAMINE 30 MG: 30 INJECTION, SOLUTION INTRAMUSCULAR; INTRAVENOUS at 09:34

## 2023-02-20 RX ADMIN — LIDOCAINE HYDROCHLORIDE,EPINEPHRINE BITARTRATE 2 ML: 15; .005 INJECTION, SOLUTION EPIDURAL; INFILTRATION; INTRACAUDAL; PERINEURAL at 05:43

## 2023-02-20 RX ADMIN — DOCUSATE SODIUM 100 MG: 100 CAPSULE, LIQUID FILLED ORAL at 20:11

## 2023-02-20 RX ADMIN — FENTANYL CITRATE 100 MCG: 50 INJECTION, SOLUTION INTRAMUSCULAR; INTRAVENOUS at 01:45

## 2023-02-20 ASSESSMENT — ACTIVITIES OF DAILY LIVING (ADL)
ADLS_ACUITY_SCORE: 18

## 2023-02-20 NOTE — PLAN OF CARE
Problem: Postpartum (Vaginal Delivery)  Goal: Successful Maternal Role Transition  Outcome: Progressing  Goal: Hemostasis  Outcome: Progressing  Goal: Anesthesia/Sedation Recovery  Outcome: Progressing  Goal: Optimal Pain Control and Function  Outcome: Progressing  Goal: Effective Urinary Elimination  Outcome: Progressing   Pt VSS. Pt is ambulating independently and is walking to bathroom regularly. Pt appears to be bonding well with , Pt is holding  and has  earlier this shift without difficulty. Pt states pain is well managed with prescribed medications.

## 2023-02-20 NOTE — PLAN OF CARE
Problem: Labor  Goal: Stable Fetal Wellbeing  Outcome: Progressing  Goal: Effective Progression to Delivery  Outcome: Progressing  Goal: Acceptable Pain Control  Outcome: Progressing  Goal: Normal Uterine Contraction Pattern  Outcome: Progressing     Oxytocin started for effective contraction pattern.  Labor positions and pain management options reviewed and discussed.

## 2023-02-20 NOTE — L&D DELIVERY NOTE
OB Vaginal Delivery Note    Caroline Rowe MRN# [id number]   Age: 27 year old YOB: 1995       GA: 40w0d  GP:   Labor Complications: None   EBL:   mL  Delivery QBL: 25 mL  Delivery Type: Vaginal, Spontaneous   ROM to Delivery Time: (Delivered) Hours: 3 Minutes: 2   Weight: 3.33 kg (7 lb 5.5 oz)    1 Minute 5 Minute 10 Minute   Apgar Totals: 9   9        HABERMANN, SAMANTHA;CADEN CHAVARRIA     Delivery Details:  Caroline Rowe, a 27 year old  female delivered a viable infant with apgars of 9  and 9 . Patient was fully dilated and pushing after 5  hours 39  minutes in active labor. She pushed for about 1 hour in multiple different positions. During labor, baby was though to be asynclitic. Delivery was via vaginal, spontaneous  to a sterile field under intravenous ;epidural  anesthesia. Infant delivered in vertex  left  occiput  anterior  position. Anterior and posterior shoulders delivered without difficulty. The cord was clamped, cut twice and 3 vessels  were noted. Cord blood was obtained in routine fashion with the following disposition: lab .      Cord complications: none   Placenta delivered at 2023  7:39 AM . Placental disposition was Hospital disposal . Fundal massage performed and fundus found to be firm.     Episiotomy: none    Perineum, vagina, cervix were inspected, and the following lacerations were noted:   Perineal lacerations: none;1st , hemostatic, did not repair.    Excellent hemostasis was noted. Needle count correct. Infant and patient in delivery room in good and stable condition.        Jae, Pending Baby Caroline [[id number]]    Labor Event Times    Labor onset date: 23 Onset time: 12:30 AM   Dilation complete date: 23 Complete time:  6:09 AM   Start pushing date/time: 2023 0630      Labor Length    1st Stage (hrs): 5 (min): 39   2nd Stage (hrs): 1 (min): 26   3rd Stage (hrs): 0 (min): 3      Labor Events     labor?: No   steroids:  "None  Predominate monitoring during 1st stage: continuous electronic fetal monitoring     Antibiotics received during labor?: No     Rupture identifier: Sac 2  Rupture date/time: 23 0433   Rupture type: Artificial Rupture of Membranes  Fluid color: Clear  Fluid odor: Normal     Augmentation: AROM  Indications for augmentation: Prolonged ROM     Delivery/Placenta Date and Time    Delivery Date: 23 Delivery Time:  7:35 AM   Placenta Date/Time: 2023  7:39 AM  Oxytocin given at the time of delivery: after delivery of baby  Delivering clinician: Betty Bush MD   Other personnel present at delivery:  Provider Role   Habermann, Samantha, RN Registered Nurse   Laxmi Ritchie RN Registered Nurse         Vaginal Counts     Initial count performed by 2 team members:  Two Team Members   pesch samantha habermann       Needles Suture Needles Sponges (RETIRED) Instruments   Initial counts 0 0 5    Added to count       Relief counts       Final counts 0 0 5          Placed during labor Accounted for at the end of labor   FSE NA NA   IUPC NA NA   Cervidil NA NA              Final count performed by 2 team members:  Two Team Members   habermann, samantha pesch      Final count correct?: Yes     Apgars    Living status: Living   1 Minute 5 Minute 10 Minute 15 Minute 20 Minute   Skin color: 1  1       Heart rate: 2  2       Reflex irritability: 2  2       Muscle tone: 2  2       Respiratory effort: 2  2       Total: 9  9       Apgars assigned by: LAXMI RITCHIE     Cord    Vessels: 3 Vessels    Cord Complications: None               Cord Blood Disposition: Lab    Gases Sent?: No    Delayed cord clamping?: Yes    Cord Clamping Delay (seconds):  seconds    Stem cell collection?: No       [place] Resuscitation    Methods: None  Output in Delivery Room: Stool      Measurements    Weight: 7 lb 5.5 oz Length: 1' 7\"   Head circumference: 34.9 cm    Output in delivery room: Stool     Skin to Skin and Feeding " Plan    Skin to skin initiation date/time: 1/2/1841    Skin to skin with: Mother  Skin to skin end date/time:        Labor Events and Shoulder Dystocia    Fetal Tracing Prior to Delivery: Category 1  Shoulder dystocia present?: Neg     Delivery (Maternal) (Provider to Complete) (308199)    Episiotomy: None  Perineal lacerations: None, 1st Repaired?: No   Repair suture: None  Genital tract inspection done: Pos     Blood Loss  Mother: Caroline Rowe P #[id number]   Start of Mother's Information    Delivery Blood Loss  02/20/23 0030 - 02/20/23 0759    Delivery QBL (mL) Hospital Encounter 25 mL    Total  25 mL         End of Mother's Information  Mother: Caroline Rowe P #[id number]          Delivery - Provider to Complete (859043)    Delivering clinician: Betty Bush MD  Attempted Delivery Types (Choose all that apply): Spontaneous Vaginal Delivery  Delivery Type (Choose the 1 that will go to the Birth History): Vaginal, Spontaneous                   Other personnel:  Provider Role   Habermann, Samantha, RN Registered Nurse   Laxmi Ritchie RN Registered Nurse                Placenta    Date/Time: 2/20/2023  7:39 AM  Removal: Spontaneous  Disposition: Hospital disposal           Anesthesia    Method: INTRAVENOUS , Epidural                Presentation and Position    Presentation: Vertex    Position: Left Occiput Anterior                 Keren Hernandez MD

## 2023-02-20 NOTE — PROGRESS NOTES
Dr Hernandez called and updated of SVE of 7 cm (gap on left side) and patient request for repeat dose of IV fentanyl.  Okay to give IV Fentanyl at this time.  Dr Hernandez planning to head to hospital soon.      IV Fentanyl given, patient repositioned to rest with peanut ball on left side.

## 2023-02-20 NOTE — ANESTHESIA PREPROCEDURE EVALUATION
Anesthesia Pre-Procedure Evaluation    Patient: Caroline Rowe   MRN: [id number] : 1995        Procedure : * No procedures listed *          Past Medical History:   Diagnosis Date     Anemia      Breast lump - right breast 2018     Breast mass, right 2018     Genital warts 10/27/2020     Urinary tract infection       History reviewed. No pertinent surgical history.   No Known Allergies   Social History     Tobacco Use     Smoking status: Never     Smokeless tobacco: Never   Substance Use Topics     Alcohol use: Not Currently     Alcohol/week: 3.0 standard drinks      Wt Readings from Last 1 Encounters:   23 71.7 kg (158 lb)        Anesthesia Evaluation   Pt has not had prior anesthetic     No history of anesthetic complications       ROS/MED HX  ENT/Pulmonary:  - neg pulmonary ROS     Neurologic:  - neg neurologic ROS     Cardiovascular:  - neg cardiovascular ROS     METS/Exercise Tolerance:     Hematologic:     (+) anemia,     Musculoskeletal:       GI/Hepatic:  - neg GI/hepatic ROS     Renal/Genitourinary:       Endo:     (+) Obesity,     Psychiatric/Substance Use:  - neg psychiatric ROS     Infectious Disease:       Malignancy:       Other:     (-) previous  and TOLAC candidate       Physical Exam    Airway        Mallampati: II    Neck ROM: full     Respiratory Devices and Support         Dental           Cardiovascular             Pulmonary                   OUTSIDE LABS:  CBC:   Lab Results   Component Value Date    WBC 15.0 (H) 2023    WBC 11.2 (H) 2022    HGB 10.9 (L) 2023    HGB 9.5 (L) 01/10/2023    HCT 34.4 (L) 2023    HCT 32.0 (L) 2022     2023     2022     BMP:   Lab Results   Component Value Date     2022    POTASSIUM 3.6 2022    CHLORIDE 103 2022    CO2 21 (L) 2022    BUN 7.5 2022    CR 0.44 (L) 2022     (H) 2022     COAGS: No results found for: PTT, INR,  FIBR  POC:   Lab Results   Component Value Date    HCG Positive (A) 08/04/2022     HEPATIC:   Lab Results   Component Value Date    ALBUMIN 4.0 08/04/2022    PROTTOTAL 7.5 08/04/2022    ALT 14 08/04/2022    AST 25 08/04/2022    ALKPHOS 49 08/04/2022    BILITOTAL 0.5 08/04/2022     OTHER:   Lab Results   Component Value Date    LUZ ELENA 9.3 08/04/2022       Anesthesia Plan    ASA Status:  2      Anesthesia Type: Epidural.              Consents    Anesthesia Plan(s) and associated risks, benefits, and realistic alternatives discussed. Questions answered and patient/representative(s) expressed understanding.    - Discussed:     - Discussed with:  Patient         Postoperative Care            Comments:    Other Comments: LE       neg OB ROS.       Alverto Zayas MD

## 2023-02-20 NOTE — ANESTHESIA PROCEDURE NOTES
"Epidural catheter Procedure Note    Pre-Procedure   Staff -        Anesthesiologist:  Alverto Zayas MD       Performed By: anesthesiologist       Location: OB       Procedure Start/Stop Times: 2/20/2023 5:25 AM and 2/20/2023 5:43 AM       Pre-Anesthestic Checklist: patient identified, IV checked, risks and benefits discussed, informed consent, monitors and equipment checked and pre-op evaluation  Timeout:       Correct Patient: Yes        Correct Procedure: Yes        Correct Site: Yes        Correct Position: Yes   Procedure Documentation  Procedure: epidural catheter       Patient Position: sitting       Patient Prep/Sterile Barriers: sterile gloves, mask, patient draped       Skin prep: Chloraprep       Local skin infiltrated with mL of 1% lidocaine.        Insertion Site: L3-4. (midline approach).       Technique: LORT saline        DARIUS at 5 cm.       Needle Type: [facility]       Needle Gauge: 18.        Needle Length (Inches): 3.5        Catheter: 20 G.          Catheter threaded easily.         6 cm epidural space.         Threaded 11 cm at skin.         # of attempts: 1 and  # of redirects:  0    Assessment/Narrative         Paresthesias: No.       Test dose of mL lidocaine 1.5% w/ 1:200,000 epinephrine at.         Test dose negative, 3 minutes after injection, for signs of intravascular, subdural, or intrathecal injection.       Insertion/Infusion Method: LORT saline       Aspiration negative for Heme or CSF via Epidural Catheter.    Medication(s) Administered   Medication Administration Time: 2/20/2023 5:25 AM      FOR [facility] ONLY:   Pain Team Contact information: please page the Pain Team Via [employer]. Search \"Pain\". During daytime hours, please page the attending first. At night please page the resident first.    "

## 2023-02-20 NOTE — PROGRESS NOTES
Patient requesting IV pain medication if able.  Discussed need to do cervical exam prior to medication, SVE 4-5/70/-2.  IV Fentanyl administered to patient rating pain 10/10 with contractions.  Lights dimmed, patient positioned to comfort and now resting in bed through  Contractions.  RN at bedside.

## 2023-02-20 NOTE — H&P
Maternal Admission H&P  [facility]  Date of Admission: 2023  Date of Service: 2023    Name      Caroline Rowe         1995  MRN       [id number]  PCP        Betty Bush at [facility], [phone].    ________________________________________________________________________    Assessment and Plan:  27 year old  at 40w0d.    Baby AGA. Anticipate .    Group B strep: negative    AROM'd forebag, clear fluid returned.    ________________________________________________________________________    Chief Complaint: SROM.    HPI: Caroline Rowe is a 27 year old woman at 40w0d, based on an Estimated Date of Delivery: 2023. She presents with SROM- had a gush of fluid around 0200 on  and arrived to the hospital around 1 PM.     Pregnancy has been complicated by iron deficiency anemia (admission hemoglobin 10.9), and rubella non-immune.     Patient Active Problem List    Diagnosis Date Noted     Beta thalassemia minor 10/27/2020     Priority: High     's status: _____         Normal labor 2023     Priority: Medium     Chronic bilateral low back pain without sciatica 2022     Priority: Medium     Pregnancy 2022     Priority: Medium     Anemia 2020     Priority: Medium     Mixed: low iron and beta thalassemia trait.         Rubella non-immune status, antepartum 2022     Priority: Low      OB History    Para Term  AB Living   2 1 1 0 0 1   SAB IAB Ectopic Multiple Live Births   0 0 0 0 1      # Outcome Date GA Lbr Fernando/2nd Weight Sex Delivery Anes PTL Lv   2 Current            1 Term 21 39w1d 07:51 / 02:56 3.289 kg (7 lb 4 oz) M Vag-Spont Nitrous, IV N EMIR      Name: Abdul      Apgar1: 5  Apgar5: 9     Review of Systems Negative except what is noted in HPI.   Past Medical History:   Diagnosis Date     Anemia      Breast lump - right breast 2018     Breast mass,  right 02/05/2018     Genital warts 10/27/2020     Urinary tract infection       No past surgical history on file.Patient has no known allergies.  Family History   Problem Relation Age of Onset     No Known Problems Mother      No Known Problems Father      No Known Problems Sister      No Known Problems Brother      Social History     Socioeconomic History     Marital status: Single     Spouse name: Not on file     Number of children: Not on file     Years of education: Not on file     Highest education level: Not on file   Occupational History     Not on file   Tobacco Use     Smoking status: Never     Smokeless tobacco: Never   Vaping Use     Vaping Use: Never used   Substance and Sexual Activity     Alcohol use: Not Currently     Alcohol/week: 3.0 standard drinks     Drug use: No     Sexual activity: Yes     Partners: Male     Birth control/protection: None   Other Topics Concern     Not on file   Social History Narrative     Not on file     Social Determinants of Health     Financial Resource Strain: Not on file   Food Insecurity: Not on file   Transportation Needs: Not on file   Physical Activity: Not on file   Stress: Not on file   Social Connections: Not on file   Intimate Partner Violence: Not on file   Housing Stability: Not on file     Prior to Admission Medication List  Medications Prior to Admission   Medication Sig Dispense Refill Last Dose     docusate sodium (COLACE) 100 MG capsule Take 1 capsule (100 mg) by mouth 2 times daily 100 capsule 3 2/19/2023     ferrous gluconate (FERGON) 324 (38 Fe) MG tablet Take 1 tablet (324 mg) by mouth every other day 100 tablet 3 Past Week     fish oil-omega-3 fatty acids 1000 MG capsule Take 2 capsules (2 g) by mouth daily 100 capsule 4 2/18/2023     Prenatal Vit-Fe Fumarate-FA (PRENATAL MULTIVITAMIN W/IRON) 27-0.8 MG tablet Take 1 tablet by mouth daily 90 tablet 3 2/18/2023      Allergies  No Known Allergies  Immunization History   Administered Date(s) Administered  "    COVID-19 Vaccine 12+ (Pfizer) 05/26/2021, 06/26/2021, 12/27/2021     COVID-19 Vaccine Bivalent Booster 12+ (Pfizer) 10/17/2022     DTAP (<7y) 02/10/1997, 04/30/1997, 07/15/1997, 02/05/1998, 03/09/2001     HEPA 06/02/2009, 08/11/2011     HPV Quadrivalent 07/02/2015     HepB 02/10/1997, 07/15/1997, 03/09/2001     Hepatitis B Immunity: Titer 08/04/2022, 09/09/2022     Influenza (IIV3) PF 02/14/2007     Influenza Vaccine >6 months (Alfuria,Fluzone) 09/23/2020, 10/17/2022     MMR 12/22/1997, 03/09/2001     Meningococcal (Menomune ) 08/11/2011     Meningococcal ACWY (Menveo ) 07/02/2015     Poliovirus, inactivated (IPV) 02/10/1997, 04/30/1997, 03/09/2001     Rubella Immunity: Titer/md Dx 09/24/2020     TD (ADULT, 7+) 06/02/2009     Tdap (Adacel,Boostrix) 06/02/2009, 01/19/2021, 12/01/2022     Varicella 12/22/1997, 06/02/2009      Physical Exam  Patient Vitals for the past 24 hrs:   BP Temp Temp src Resp SpO2 Height Weight   02/20/23 0428 -- -- -- -- 96 % -- --   02/20/23 0409 114/70 -- -- -- -- -- --   02/20/23 0408 -- -- -- -- 95 % -- --   02/20/23 0312 -- 98.5  F (36.9  C) Oral -- -- -- --   02/20/23 0151 -- 98.1  F (36.7  C) Oral -- -- -- --   02/20/23 0147 -- -- -- -- 97 % -- --   02/20/23 0108 114/72 -- -- -- -- -- --   02/20/23 0045 -- 97.9  F (36.6  C) Oral -- -- -- --   02/19/23 2337 -- 98.4  F (36.9  C) Oral -- -- -- --   02/19/23 2311 117/75 -- -- -- -- -- --   02/19/23 2248 -- 98.6  F (37  C) Oral -- -- -- --   02/19/23 2030 114/74 98.4  F (36.9  C) Oral 18 -- -- --   02/19/23 1802 -- 99.3  F (37.4  C) Oral -- -- -- --   02/19/23 1537 -- 98.6  F (37  C) Oral 22 -- 1.549 m (5' 1\") 71.7 kg (158 lb)   02/19/23 1400 114/62 -- -- -- -- -- --     Wt Readings from Last 1 Encounters:   02/19/23 71.7 kg (158 lb)   Prepregnancy: 59 kg (130 lb), BMI 24.58. Total gain: 12.7 kg (28 lb) (expected gain: 11.5 kg (25 lb)-16 kg (35 lb)).    HEART: RRR, no murmur  LUNGS: CTA bilaterally  Fetal Heart Tones: Baseline 150 bpm, " variability moderate (6-25 bpm), no decelerations. Reactive.  CONTRACTIONS:  irregular, every 2-3 minutes.  CERVIX: dilation 7 cm , 90% effaced, soft, and -2 station.  FLUID: Clear.  Fetal Presentation: vertex.  Labs  O pos  Hep B neg  GBS neg  Admission on 02/19/2023   Component Date Value Ref Range Status     Rupture of Fetal Membranes by ROM * 02/19/2023 Positive (A)  Negative, Invalid, Suggest Repeat Final     ABO/RH(D) 02/19/2023 O POS   Final     Antibody Screen 02/19/2023 Negative  Negative Final     SPECIMEN EXPIRATION DATE 02/19/2023 [id number]   Final     WBC Count 02/19/2023 15.0 (H)  4.0 - 11.0 10e3/uL Final     RBC Count 02/19/2023 4.93  3.80 - 5.20 10e6/uL Final     Hemoglobin 02/19/2023 10.9 (L)  11.7 - 15.7 g/dL Final     Hematocrit 02/19/2023 34.4 (L)  35.0 - 47.0 % Final     MCV 02/19/2023 70 (L)  78 - 100 fL Final     MCH 02/19/2023 22.1 (L)  26.5 - 33.0 pg Final     MCHC 02/19/2023 31.7  31.5 - 36.5 g/dL Final     RDW 02/19/2023 17.2 (H)  10.0 - 15.0 % Final     Platelet Count 02/19/2023 206  150 - 450 10e3/uL Final     % Neutrophils 02/19/2023 73  % Final     % Lymphocytes 02/19/2023 15  % Final     % Monocytes 02/19/2023 8  % Final     % Eosinophils 02/19/2023 2  % Final     % Basophils 02/19/2023 0  % Final     % Immature Granulocytes 02/19/2023 2  % Final     NRBCs per 100 WBC 02/19/2023 1 (H)  <1 /100 Final     Absolute Neutrophils 02/19/2023 11.2 (H)  1.6 - 8.3 10e3/uL Final     Absolute Lymphocytes 02/19/2023 2.2  0.8 - 5.3 10e3/uL Final     Absolute Monocytes 02/19/2023 1.2  0.0 - 1.3 10e3/uL Final     Absolute Eosinophils 02/19/2023 0.3  0.0 - 0.7 10e3/uL Final     Absolute Basophils 02/19/2023 0.1  0.0 - 0.2 10e3/uL Final     Absolute Immature Granulocytes 02/19/2023 0.3  <=0.4 10e3/uL Final     Absolute NRBCs 02/19/2023 0.1  10e3/uL Final      Group B Strep PCR   Date Value Ref Range Status   01/24/2023 Negative Negative Final     Comment:     Presumed negative for Streptococcus  agalactiae (Group B Streptococcus) or the number of organisms may be below the limit of detection of the assay.      Antibody Screen   Date Value Ref Range Status   02/19/2023 Negative Negative Final     Hepatitis B Surface Antigen   Date Value Ref Range Status   08/04/2022 Nonreactive Nonreactive Final     Chlamydia trachomatis   Date Value Ref Range Status   12/01/2022 Negative Negative Final     Comment:     A negative result by transcription mediated amplification does not preclude the presence of C. trachomatis infection because results are dependent on proper and adequate collection, absence of inhibitors and sufficient rRNA to be detected.     Neisseria gonorrhoeae   Date Value Ref Range Status   12/01/2022 Negative Negative Final     Comment:     Negative for N. gonorrhoeae rRNA by transcription mediated amplification. A negative result by transcription mediated amplification does not preclude the presence of C. trachomatis infection because results are dependent on proper and adequate collection, absence of inhibitors and sufficient rRNA to be detected.     Hemoglobin   Date Value Ref Range Status   02/19/2023 10.9 (L) 11.7 - 15.7 g/dL Final        Completed by:   Keren Hernandez MD  [facility]  2/20/2023 4:44 AM   used: Not needed.

## 2023-02-20 NOTE — PROGRESS NOTES
Epidural insertion  Pt requesting for epidural.  called and order for epidural obtained. Consent form obtained. Peripheral line inserted with 28guage  And fluid flush initiated.  0520,HAYDEE called for epidural insertion.  0526, HAYDEE arrives at pt bedside. Pause for cause done. Pt positioned for the procedure  0536, Test dose given, pt tolerated it well.  0539, Pt positioned on the back.

## 2023-02-21 VITALS
HEART RATE: 79 BPM | BODY MASS INDEX: 29.83 KG/M2 | OXYGEN SATURATION: 98 % | DIASTOLIC BLOOD PRESSURE: 59 MMHG | SYSTOLIC BLOOD PRESSURE: 105 MMHG | RESPIRATION RATE: 19 BRPM | WEIGHT: 158 LBS | HEIGHT: 61 IN | TEMPERATURE: 98.4 F

## 2023-02-21 PROCEDURE — 99207 PR SUBSEQUENT HOSPITAL CARE FOR MOTHER, 15 MINUTES: CPT | Performed by: FAMILY MEDICINE

## 2023-02-21 PROCEDURE — 250N000013 HC RX MED GY IP 250 OP 250 PS 637: Performed by: FAMILY MEDICINE

## 2023-02-21 RX ORDER — IBUPROFEN 800 MG/1
800 TABLET, FILM COATED ORAL EVERY 6 HOURS PRN
Qty: 20 TABLET | Refills: 0 | Status: SHIPPED | OUTPATIENT
Start: 2023-02-21 | End: 2023-03-01

## 2023-02-21 RX ORDER — ACETAMINOPHEN 325 MG/1
325-650 TABLET ORAL EVERY 4 HOURS PRN
Qty: 30 TABLET | Refills: 0 | COMMUNITY
Start: 2023-02-21 | End: 2023-04-04

## 2023-02-21 RX ADMIN — ACETAMINOPHEN 650 MG: 325 TABLET ORAL at 09:17

## 2023-02-21 RX ADMIN — IBUPROFEN 800 MG: 800 TABLET ORAL at 09:17

## 2023-02-21 RX ADMIN — ACETAMINOPHEN 650 MG: 325 TABLET ORAL at 15:04

## 2023-02-21 RX ADMIN — ACETAMINOPHEN 650 MG: 325 TABLET ORAL at 03:56

## 2023-02-21 RX ADMIN — IBUPROFEN 800 MG: 800 TABLET ORAL at 15:04

## 2023-02-21 RX ADMIN — DOCUSATE SODIUM 100 MG: 100 CAPSULE, LIQUID FILLED ORAL at 09:16

## 2023-02-21 ASSESSMENT — ACTIVITIES OF DAILY LIVING (ADL)
ADLS_ACUITY_SCORE: 18

## 2023-02-21 NOTE — PROGRESS NOTES
Outreach Nurse Note    Caroline Rowe  [id number]  1995    Chart reviewed, discharge follow-up plan discussed with attending physician, and patient's bedside RN, needs assessed. Post-delivery follow-up clinic appointments with  planned in 2 & 6 weeks at [facility].       Patient, Caroline, is reported to have support at home and is ready to discharge today with . Outreach nurse will continue to follow and assist with discharge planning as needed. No additional needs identified at this time.

## 2023-02-21 NOTE — DISCHARGE SUMMARY
Maternal Discharge Summary  [facility]  Date of Service: 2023    Name      Caroline Rowe         1995  MRN       [id number]  PCP        Betty Wahl at [facility], [phone].    ________________________________________________________________________    Assessment:  Caroline Rowe is a 27 year old now  s/p vaginal, spontaneous  at 40w0d on 23.    Discharge Plan:   1. Discharge to Home. Condition at Discharge:  stable  2. Physical activity: Regular.  3. Diet:  Regular.  4. Home care nurse: not available.  5. Lactation clinic appointment: ordered.  6. Contraception plan: Planning Nexplanon.  7. Follow up with Betty Wahl in 2 weeks and 6 weeks for routine postpartum care.  Future Appointments   Date Time Provider Department Center   2023  1:20 PM Betty Bush MD [facility]           Medication List      Started    acetaminophen 325 MG tablet  Commonly known as: TYLENOL  325-650 mg, Oral, EVERY 4 HOURS PRN     ibuprofen 800 MG tablet  Commonly known as: ADVIL/MOTRIN  800 mg, Oral, EVERY 6 HOURS PRN           ________________________________________________________________________    Admission Date:  2023  Discharge Date:  2023  Delivery Date:  23  Gestational Age at Delivery: 40w0d    Principal Diagnosis: Labor and delivery  Delivery type: Vaginal, Spontaneous     Active Problems:     (normal spontaneous vaginal delivery)      Subjective:  Patient denies headache, dizziness, dyspnea, and leg pain. Ambulating and eating.  Was having quite a bit of pain alternating tylenol and ibuprofen, but just now starting to take together.    Discharge Exam:  Patient Vitals for the past 24 hrs:   BP Temp Temp src Pulse Resp SpO2   23 0752 103/71 98  F (36.7  C) Oral 93 18 --   23 0400 94/62 97.4  F (36.3  C) Oral 74 18 97 %   23 2335 92/51 98.3  F (36.8  C) Oral 87 18 97 %    02/20/23 2017 112/64 97.7  F (36.5  C) Oral 82 18 98 %   02/20/23 1716 102/66 -- -- 86 20 97 %   02/20/23 1330 96/50 98.2  F (36.8  C) Oral 86 16 --     General - alert, comfortable  Heart - RRR, no murmurs  Lungs - CTA bilaterally  Abdomen - fundus firm, nontender, below umbilicus  Extremities - trace edema  Admission on 02/19/2023   Component Date Value Ref Range Status     Rupture of Fetal Membranes by ROM * 02/19/2023 Positive (A)  Negative, Invalid, Suggest Repeat Final     Treponema Antibody Total 02/19/2023 Nonreactive  Nonreactive Final     ABO/RH(D) 02/19/2023 O POS   Final     Antibody Screen 02/19/2023 Negative  Negative Final     SPECIMEN EXPIRATION DATE 02/19/2023 [id number]   Final     WBC Count 02/19/2023 15.0 (H)  4.0 - 11.0 10e3/uL Final     RBC Count 02/19/2023 4.93  3.80 - 5.20 10e6/uL Final     Hemoglobin 02/19/2023 10.9 (L)  11.7 - 15.7 g/dL Final     Hematocrit 02/19/2023 34.4 (L)  35.0 - 47.0 % Final     MCV 02/19/2023 70 (L)  78 - 100 fL Final     MCH 02/19/2023 22.1 (L)  26.5 - 33.0 pg Final     MCHC 02/19/2023 31.7  31.5 - 36.5 g/dL Final     RDW 02/19/2023 17.2 (H)  10.0 - 15.0 % Final     Platelet Count 02/19/2023 206  150 - 450 10e3/uL Final     % Neutrophils 02/19/2023 73  % Final     % Lymphocytes 02/19/2023 15  % Final     % Monocytes 02/19/2023 8  % Final     % Eosinophils 02/19/2023 2  % Final     % Basophils 02/19/2023 0  % Final     % Immature Granulocytes 02/19/2023 2  % Final     NRBCs per 100 WBC 02/19/2023 1 (H)  <1 /100 Final     Absolute Neutrophils 02/19/2023 11.2 (H)  1.6 - 8.3 10e3/uL Final     Absolute Lymphocytes 02/19/2023 2.2  0.8 - 5.3 10e3/uL Final     Absolute Monocytes 02/19/2023 1.2  0.0 - 1.3 10e3/uL Final     Absolute Eosinophils 02/19/2023 0.3  0.0 - 0.7 10e3/uL Final     Absolute Basophils 02/19/2023 0.1  0.0 - 0.2 10e3/uL Final     Absolute Immature Granulocytes 02/19/2023 0.3  <=0.4 10e3/uL Final     Absolute NRBCs 02/19/2023 0.1  10e3/uL Final       Discharge Medications:   See medication reconciliation.     Completed by:   Cris Liao MD  [facility]  2/21/2023 10:18 AM   used: Not needed.

## 2023-02-21 NOTE — PLAN OF CARE
VSS, progressing WDL, pain is controlled with PRN medications, independent with infant cares and bondingwell. continue to monitor per unit routine, no concern at the time.     Problem: Postpartum (Vaginal Delivery)  Goal: Hemostasis  Outcome: Adequate for Care Transition  Goal: Optimal Pain Control and Function  Outcome: Adequate for Care Transition  Goal: Effective Urinary Elimination  Outcome: Adequate for Care Transition

## 2023-02-21 NOTE — PLAN OF CARE
"Care Plan Progress Note      Name: Caroline Rowe  : 1995  MRN: [id number]    VS: BP 94/62 (BP Location: Left arm)   Pulse 74   Temp 97.4  F (36.3  C) (Oral)   Resp 18   Ht 1.549 m (5' 1\")   Wt 71.7 kg (158 lb)   LMP 2022 (Exact Date)   SpO2 97%   Breastfeeding Unknown   BMI 29.85 kg/m    VSS, pain controlled with po pain meds, showered on evening shift.  Fundus firm lochia light.  Breastfeeding infant, educated on proper latch.  Bonding well with infant.     Malissa Barbosa RN   2023  5:03 AM    "

## 2023-02-21 NOTE — ANESTHESIA POSTPROCEDURE EVALUATION
Patient: Caroline Rowe    Procedure: * No procedures listed *       Anesthesia Type:  No value filed.    Note:     Postop Pain Control: Uneventful            Sign Out: Well controlled pain   PONV: No   Neuro/Psych: Uneventful            Sign Out: Acceptable/Baseline neuro status   Airway/Respiratory: Uneventful            Sign Out: Acceptable/Baseline resp. status   CV/Hemodynamics: Uneventful            Sign Out: Acceptable CV status; No obvious hypovolemia; No obvious fluid overload   Other NRE: NONE   DID A NON-ROUTINE EVENT OCCUR? No           Last vitals:  Vitals:    02/20/23 2335 02/21/23 0400 02/21/23 0752   BP: 92/51 94/62 103/71   Pulse: 87 74 93   Resp: 18 18 18   Temp: 36.8  C (98.3  F) 36.3  C (97.4  F) 36.7  C (98  F)   SpO2: 97% 97%        Electronically Signed By: Liz Borges MD  February 21, 2023  9:13 AM

## 2023-02-22 NOTE — PLAN OF CARE
Problem: Plan of Care - These are the overarching goals to be used throughout the patient stay.    Goal: Plan of Care Review  Description: The Plan of Care Review/Shift note should be completed every shift.  The Outcome Evaluation is a brief statement about your assessment that the patient is improving, declining, or no change.  This information will be displayed automatically on your shift note.  Outcome: Met  Flowsheets (Taken 2/21/2023 6531)  Plan of Care Reviewed With:   patient   spouse  Overall Patient Progress: improving    Patient verbalizes understanding all discharge instructions and follow up plans.

## 2023-02-24 ENCOUNTER — PRENATAL OFFICE VISIT (OUTPATIENT)
Dept: FAMILY MEDICINE | Facility: CLINIC | Age: 28
End: 2023-02-24
Payer: COMMERCIAL

## 2023-02-24 VITALS
DIASTOLIC BLOOD PRESSURE: 68 MMHG | OXYGEN SATURATION: 98 % | TEMPERATURE: 98.2 F | SYSTOLIC BLOOD PRESSURE: 104 MMHG | RESPIRATION RATE: 18 BRPM | HEART RATE: 80 BPM

## 2023-02-24 DIAGNOSIS — R52 POSTPARTUM PAIN: Primary | ICD-10-CM

## 2023-02-24 DIAGNOSIS — K59.04 FUNCTIONAL CONSTIPATION: ICD-10-CM

## 2023-02-24 PROCEDURE — 99213 OFFICE O/P EST LOW 20 MIN: CPT | Mod: 24 | Performed by: FAMILY MEDICINE

## 2023-02-24 RX ORDER — DOCUSATE SODIUM 100 MG/1
100 CAPSULE, LIQUID FILLED ORAL 2 TIMES DAILY
Qty: 100 CAPSULE | Refills: 3 | Status: SHIPPED | OUTPATIENT
Start: 2023-02-24 | End: 2023-05-01

## 2023-02-24 RX ORDER — IBUPROFEN 600 MG/1
600 TABLET, FILM COATED ORAL EVERY 6 HOURS PRN
Qty: 50 TABLET | Refills: 3 | Status: SHIPPED | OUTPATIENT
Start: 2023-02-24 | End: 2023-05-01

## 2023-02-24 NOTE — PROGRESS NOTES
Office Visit  [facility]  Date of Service: 2023      Subjective   Caroline Rowe is a 27 year old female who presents for   Chief Complaint   Patient presents with     Post Partum Exam      23. Used epidural (and really liked it). Asynclitic presentation. No complications.    Baby girl Jairo is doing well. Some Jaundice.     Caroline has been having pelvic pain.Lots of cramping. Bleeding hasn't started to lighten yet. No abnormal discharge otherwise. Pain is interfering with sleep. Has tylenol, but it's not enough. Also needs stool softener.     Objective   /68 (BP Location: Right arm, Patient Position: Sitting, Cuff Size: Adult Regular)   Pulse 80   Temp 98.2  F (36.8  C) (Oral)   Resp 18   LMP 2022 (Exact Date)   SpO2 98%   Breastfeeding Yes  She reports that she has never smoked. She has never used smokeless tobacco.    Gen: Alert, no apparent distress.  Abd: fundus firm at U-3 and not tender. Bimanual exam: No abnormal tissue, bleeding or tendernss.     No results found for any visits on 23.  Assessment & Plan     1. Pelvic pain postpartum. Exam is unremarkable. Suspect normal cramping. Rx sent for ibuprofen. Check US to evaluate for retained products of conception. If symptoms worsen or don't improve, will need recheck.       Order Summary                                                      Postpartum pain  -     US Pelvic Complete with Transvaginal; Future  -     ibuprofen (ADVIL/MOTRIN) 600 MG tablet; Take 1 tablet (600 mg) by mouth every 6 hours as needed for moderate pain (4-6)    Functional constipation  -     docusate sodium (COLACE) 100 MG capsule; Take 1 capsule (100 mg) by mouth 2 times daily            No future appointments.    Completed by: Betty Bush M.D., [facility]. 2023 11:15 AM.  This transcription uses voice recognition software, which may contain typographical errors.  MDM: CARLOS EDUARDO  neighborhood: [place] [address], language: English, .

## 2023-04-29 ENCOUNTER — HEALTH MAINTENANCE LETTER (OUTPATIENT)
Age: 28
End: 2023-04-29

## 2023-05-01 ENCOUNTER — OFFICE VISIT (OUTPATIENT)
Dept: FAMILY MEDICINE | Facility: CLINIC | Age: 28
End: 2023-05-01
Payer: COMMERCIAL

## 2023-05-01 ENCOUNTER — MEDICAL CORRESPONDENCE (OUTPATIENT)
Dept: HEALTH INFORMATION MANAGEMENT | Facility: CLINIC | Age: 28
End: 2023-05-01

## 2023-05-01 VITALS
HEART RATE: 79 BPM | TEMPERATURE: 98.3 F | OXYGEN SATURATION: 100 % | DIASTOLIC BLOOD PRESSURE: 73 MMHG | BODY MASS INDEX: 23.41 KG/M2 | HEIGHT: 61 IN | WEIGHT: 124 LBS | RESPIRATION RATE: 16 BRPM | SYSTOLIC BLOOD PRESSURE: 109 MMHG

## 2023-05-01 DIAGNOSIS — Z13.6 SCREENING FOR CARDIOVASCULAR CONDITION: ICD-10-CM

## 2023-05-01 DIAGNOSIS — R20.0 NUMBNESS OF FINGERS: Primary | ICD-10-CM

## 2023-05-01 LAB
CHOLEST SERPL-MCNC: 184 MG/DL
HBA1C MFR BLD: 4.6 % (ref 0–5.6)
HDLC SERPL-MCNC: 65 MG/DL
HGB BLD-MCNC: 11.8 G/DL (ref 11.7–15.7)
LDLC SERPL CALC-MCNC: 95 MG/DL
NONHDLC SERPL-MCNC: 119 MG/DL
TRIGL SERPL-MCNC: 120 MG/DL
TSH SERPL DL<=0.005 MIU/L-ACNC: 0.62 UIU/ML (ref 0.3–4.2)

## 2023-05-01 PROCEDURE — 85018 HEMOGLOBIN: CPT | Performed by: PHYSICIAN ASSISTANT

## 2023-05-01 PROCEDURE — 80061 LIPID PANEL: CPT | Performed by: PHYSICIAN ASSISTANT

## 2023-05-01 PROCEDURE — 36415 COLL VENOUS BLD VENIPUNCTURE: CPT | Performed by: PHYSICIAN ASSISTANT

## 2023-05-01 PROCEDURE — 83036 HEMOGLOBIN GLYCOSYLATED A1C: CPT | Performed by: PHYSICIAN ASSISTANT

## 2023-05-01 PROCEDURE — 99213 OFFICE O/P EST LOW 20 MIN: CPT | Performed by: PHYSICIAN ASSISTANT

## 2023-05-01 PROCEDURE — 84443 ASSAY THYROID STIM HORMONE: CPT | Performed by: PHYSICIAN ASSISTANT

## 2023-05-01 RX ORDER — TRIAMCINOLONE ACETONIDE 1 MG/G
CREAM TOPICAL 2 TIMES DAILY
Qty: 15 G | Refills: 1 | Status: SHIPPED | OUTPATIENT
Start: 2023-05-01 | End: 2024-01-11

## 2023-05-01 NOTE — PROGRESS NOTES
"  Subjective:      Caroline Rowe is a 27 year old female with chief complaint of skin problems with fingers.  All of the fingers on her left hand have felt different recently.  Is been going on for 2 to 3 weeks.  She has noticed lines on the pads of the fingers, dry skin, sometimes the fingers feel numb.  She cannot think of anything that would have caused this.  No change in soaps, detergents, or lotions.  No new recent repetitive activities that could account for change.  She has been trying to keep the using normal moisturizer to keep the skin moist.  That does seem to help a little bit.  She never had a problem like this before.  Skin on fingers on right hand unaffected, no similar symptoms anywhere else.  She is a few months postpartum.    Patient Active Problem List   Diagnosis     Beta thalassemia minor     Anemia     Pregnancy     Rubella non-immune status, antepartum     Chronic bilateral low back pain without sciatica      (normal spontaneous vaginal delivery)     Lactating mother       Current Outpatient Medications:      triamcinolone (KENALOG) 0.1 % external cream, Apply topically 2 times daily, Disp: 15 g, Rfl: 1     Objective:     Allergies:  Patient has no known allergies.    Vitals:  /73 (BP Location: Left arm, Patient Position: Sitting, Cuff Size: Adult Regular)   Pulse 79   Temp 98.3  F (36.8  C) (Temporal)   Resp 16   Ht 1.549 m (5' 1\")   Wt 56.2 kg (124 lb)   LMP 2022 (Exact Date)   SpO2 100%   Breastfeeding Yes   BMI 23.43 kg/m    Body mass index is 23.43 kg/m .    Vital signs reviewed.  General: Patient is alert and oriented x 3, in no apparent distress  Cardiac: regular rate and rhythm, no murmurs  Pulmonary: lungs clear to auscultation bilaterally  Skin: All the thing fingers and thumb on her left hand have slightly dry skin, especially the thumb.  This is only in the pads of the fingers themselves.  No significant erythema, no edema, no pain with palpation, no other " skin changes noted elsewhere on the hand    Results for orders placed or performed in visit on 05/01/23   Hemoglobin A1c     Status: Normal   Result Value Ref Range    Hemoglobin A1C 4.6 0.0 - 5.6 %   Hemoglobin     Status: Normal   Result Value Ref Range    Hemoglobin 11.8 11.7 - 15.7 g/dL     Other labs pending.    Assessment and Plan:   1. Numbness of fingers  Chronic, stable.  Present for several weeks, not really getting worse or better.  Unclear etiology.  Could be nonspecific dry skin, no obvious triggers.  Screening labs ordered and I will follow-up with results.  For now trial of steroid cream and monitor.  If labs are normal, and steroid cream is not helpful after few weeks, could consider referral to dermatology or follow-up with PCP.  Patient is in agreement with this plan.  Let us know sooner if anything is worsening or new symptoms.  - Hemoglobin; Future  - TSH with free T4 reflex  - Hemoglobin A1c  - triamcinolone (KENALOG) 0.1 % external cream; Apply topically 2 times daily  Dispense: 15 g; Refill: 1  - Hemoglobin    2. Screening for cardiovascular condition  Ordered by PCP, Dr. Bush to address.  - Lipid panel reflex to direct LDL Fasting      This dictation uses voice recognition software, which may contain typographical errors.

## 2023-07-20 ENCOUNTER — TRANSFERRED RECORDS (OUTPATIENT)
Dept: HEALTH INFORMATION MANAGEMENT | Facility: CLINIC | Age: 28
End: 2023-07-20

## 2024-01-01 ENCOUNTER — NURSE TRIAGE (OUTPATIENT)
Dept: NURSING | Facility: CLINIC | Age: 29
End: 2024-01-01
Payer: COMMERCIAL

## 2024-01-01 NOTE — TELEPHONE ENCOUNTER
"Nurse Triage SBAR    Is this a 2nd Level Triage? NO    Situation: Headache, eye pain    Background: Patient calling, states that she has a severe headache and eye pain.  She states that her eyes hurt, especially when bending forward.  She has been taking ibuprofen with no relief.  She states it is the worst headache she has had. She denies any numbness and weakness, denies head injury, denies fever.     Assessment: possible  migraine, eye pain    Protocol Recommended Disposition:   Go to ED Now    Recommendation: Patient advised to go to the ED. Patient verbalized understanding and agreement with the plan of care.     N/A    FRED MONTOYA RN    Does the patient meet one of the following criteria for ADS visit consideration? No    Reason for Disposition   [1] SEVERE headache (e.g., excruciating) AND [2] \"worst headache\" of life    Additional Information   Negative: Difficult to awaken or acting confused (e.g., disoriented, slurred speech)   Negative: [1] Weakness of the face, arm or leg on one side of the body AND [2] new-onset   Negative: [1] Numbness of the face, arm or leg on one side of the body AND [2] new-onset   Negative: [1] Loss of speech or garbled speech AND [2] new-onset   Negative: Passed out (i.e., lost consciousness, collapsed and was not responding)   Negative: Sounds like a life-threatening emergency to the triager   Negative: Followed a head injury   Negative: Pregnant   Negative: Postpartum (from 0 to 6 weeks after delivery)   Negative: Traumatic Brain Injury (TBI) is suspected   Negative: Unable to walk, or can only walk with assistance (e.g., requires support)   Negative: Stiff neck (can't touch chin to chest)   Negative: Severe pain in one eye   Negative: [1] Other family members (or people in same household) with headaches AND [2] possibility of carbon monoxide exposure    Protocols used: Headache-A-AH    "

## 2024-01-10 ENCOUNTER — NURSE TRIAGE (OUTPATIENT)
Dept: FAMILY MEDICINE | Facility: CLINIC | Age: 29
End: 2024-01-10
Payer: COMMERCIAL

## 2024-01-10 NOTE — TELEPHONE ENCOUNTER
" S-(situation): Unresolved red bilateral eyes in sclera, left worst than rt with severe pain of 7/10 with blinking and itchiness 4/10. Redness is slightly improved but still present.     B-(background): On 1/1/24 be experience sudden onset of  excruciating  migraine pain of 10/10 with eye pain and redness in bilateral sclera. Migraine was mildly relief with OTC Ibuprofen and eventually resolved but eye pain with redness is still present.     A-(assessment): No eye swelling, no discharge. Bilateral corners of sclera is red with 7/10 pain and some itchiness.     R-(recommendations): Pt be evaluated in clinic same day.  Pt declined same day appt and requested for next day appt due to babysitting availability. Appt scheduled for 1/11/24 at 4pm.   RN also advised pt to go to UC/ED if experiencing sudden changes in vision or symptoms worsened. Pt verbalized understanding and agrees to follow plan.     Magali PANDYA RN  [facility]      Reason for Disposition   MODERATE eye pain or discomfort (e.g., interferes with normal activities or awakens from sleep; more than mild)    Additional Information   Negative: Chemical got in the eye   Negative: Piece of something got in the eye   Negative: Followed an eye injury   Negative: Yellow or green pus in the eyes   Negative: Eyelid is swollen and no redness of white of eye (sclera)   Negative: SEVERE eye pain   Negative: Recent eye surgery and has increasing eye pain   Negative: Patient sounds very sick or weak to the triager    Answer Assessment - Initial Assessment Questions  1. LOCATION: Location: \"What's red, the eyeball or the outer eyelids?\" (Note: when callers say the eye is red, they usually mean the sclera is red)        Bilateral redness in white of eyes, left worst than right    2. REDNESS OF SCLERA: \"Is the redness in one or both eyes?\" \"When did the redness start?\"       1/1/24 when migraine started. Initially, redness was worst and it appears to " "be better but still present    3. ONSET: \"When did the eye become red?\" (e.g., hours, days)       1/1/24  4. EYELIDS: \"Are the eyelids red or swollen?\" If Yes, ask: \"How much?\"       No    5. VISION: \"Is there any difficulty seeing clearly?\"       No    6. ITCHING: \"Does it feel itchy?\" If so ask: \"How bad is it\" (e.g., Scale 1-10; or mild, moderate, severe)      4, but more concern with pain    7. PAIN: \"Is there any pain? If Yes, ask: \"How bad is it?\" (e.g., Scale 1-10; or mild, moderate, severe)      Pain comes and goes  but if pain is present it is about a 7.  Blinking and moving eyes evokes pain.     8. CONTACT LENS: \"Do you wear contacts?\"      No    9. CAUSE: \"What do you think is causing the redness?\"      I think it may be related to migraines because the redness started  with the onset of migraine. This was the first time I have had a migraine of 10/10. Ibuprofen helps a little. Migraine eventually resolve on its own.     When the migraine started, my yes started hurting and became red.     10. OTHER SYMPTOMS: \"Do you have any other symptoms?\" (e.g., fever, runny nose, cough, vomiting)        No    Protocols used: Eye - Red Without Pus-A-OH    "

## 2024-01-11 ENCOUNTER — OFFICE VISIT (OUTPATIENT)
Dept: FAMILY MEDICINE | Facility: CLINIC | Age: 29
End: 2024-01-11
Payer: COMMERCIAL

## 2024-01-11 ENCOUNTER — TELEPHONE (OUTPATIENT)
Dept: OPHTHALMOLOGY | Facility: CLINIC | Age: 29
End: 2024-01-11

## 2024-01-11 VITALS
HEIGHT: 61 IN | OXYGEN SATURATION: 100 % | RESPIRATION RATE: 18 BRPM | SYSTOLIC BLOOD PRESSURE: 118 MMHG | DIASTOLIC BLOOD PRESSURE: 75 MMHG | BODY MASS INDEX: 25.49 KG/M2 | HEART RATE: 77 BPM | WEIGHT: 135 LBS | TEMPERATURE: 97.4 F

## 2024-01-11 DIAGNOSIS — H10.33 ACUTE CONJUNCTIVITIS OF BOTH EYES, UNSPECIFIED ACUTE CONJUNCTIVITIS TYPE: ICD-10-CM

## 2024-01-11 DIAGNOSIS — H57.13 PAIN OF BOTH EYES: Primary | ICD-10-CM

## 2024-01-11 PROCEDURE — 99213 OFFICE O/P EST LOW 20 MIN: CPT | Performed by: STUDENT IN AN ORGANIZED HEALTH CARE EDUCATION/TRAINING PROGRAM

## 2024-01-11 RX ORDER — POLYMYXIN B SULFATE AND TRIMETHOPRIM 1; 10000 MG/ML; [USP'U]/ML
1-2 SOLUTION OPHTHALMIC EVERY 4 HOURS
Qty: 10 ML | Refills: 0 | Status: SHIPPED | OUTPATIENT
Start: 2024-01-11 | End: 2024-01-16

## 2024-01-11 NOTE — TELEPHONE ENCOUNTER
[facility] Call Center    Phone Message    May a detailed message be left on voicemail: yes     Reason for Call: Appointment Intake    Referring Provider Name: [facility]    Diagnosis and/or Symptoms: Pain of both eyes [H57.13]    Sent Urgent: 3-5 Days    Action Taken: Message routed to:  [facility] eye    Travel Screening: Not Applicable

## 2024-01-11 NOTE — PROGRESS NOTES
"  Assessment & Plan     Pain of both eyes  Acute conjunctivitis of both eyes, unspecified acute conjunctivitis type  Started 2 weeks ago patient had a migraine and flulike symptoms.  Headaches resolved with ibuprofen. Suspect that this is a bacterial superinfection, however, does report eye pain, mostly with touching her eyes.  Denies vision loss, floaters, lights.  I did also consider cluster headaches, however no longer having headaches and still having conjunctival redness. Will prescribe antibiotic drops and referral to ophthalmology.    - Adult Eye  Referral  - polymixin b-trimethoprim (POLYTRIM) 37329-4.1 UNIT/ML-% ophthalmic solution  Dispense: 10 mL; Refill: 0        Review of external notes as documented elsewhere in note       BMI:   Estimated body mass index is 25.52 kg/m  as calculated from the following:    Height as of this encounter: 1.549 m (5' 0.98\").    Weight as of this encounter: 61.2 kg (135 lb).           Anne-Marie Jarrett MD  [facility]    Dharmesh Velazquez is a 28 year old, presenting for the following health issues:  office visit (Redness in eyes, pain)        1/11/2024     3:52 PM   Additional Questions   Roomed by    Accompanied by self       History of Present Illness       Reason for visit:  Redness/pain in eyes  Symptom onset:  1-2 weeks ago  Symptom intensity:  Mild  Symptom progression:  Staying the same  Had these symptoms before:  No    She eats 2-3 servings of fruits and vegetables daily.She consumes 1 sweetened beverage(s) daily.She exercises with enough effort to increase her heart rate 9 or less minutes per day.  She exercises with enough effort to increase her heart rate 3 or less days per week.   She is taking medications regularly.    Thinks it started with having migraine and infection/fever/body aches.   Had BL eye pain initially, no longer has pain except when she touches her eyes.     Migraine resolved with ibuprofen.     No crusting in the " "morning    Red flag symptoms: denies loss of vision, decrease to vision, peripheral or central field of vision defect, sudden flashing lights or floaters, or any or conjunctival laceration.      Review of Systems   Constitutional: Negative for fever and chills.   Respiratory: Negative for cough or shortness of breath.    Cardiovascular: Negative for chest pain or chest pressure.   Gastrointestinal: Negative for nausea, vomiting, diarrhea or abdominal pain.        Objective    /75 (BP Location: Left arm, Patient Position: Sitting, Cuff Size: Adult Regular)   Pulse 77   Temp 97.4  F (36.3  C) (Temporal)   Resp 18   Ht 1.549 m (5' 0.98\")   Wt 61.2 kg (135 lb)   LMP 12/28/2023 (Approximate)   SpO2 100%   BMI 25.52 kg/m    Body mass index is 25.52 kg/m .  Physical Exam   General: Well developed, well nourished.  Skin:  Dry without rash.    Head:  Normocephalic-atraumatic.    Eye: Bilateral conjunctival injection, left greater than right.  Normal pupil reaction and extraocular movements.  Respiratory:  Normal respiratory effort.   Gastrointestinal:  Non-distended.    Musculoskeletal:  No deformity or edema.  Neurologic: No focal deficits.        Prior to immunization administration, verified patients identity using patient s name and date of birth. Please see Immunization Activity for additional information.     Screening Questionnaire for Adult Immunization    Are you sick today?   No   Do you have allergies to medications, food, a vaccine component or latex?   No   Have you ever had a serious reaction after receiving a vaccination?   No   Do you have a long-term health problem with heart, lung, kidney, or metabolic disease (e.g., diabetes), asthma, a blood disorder, no spleen, complement component deficiency, a cochlear implant, or a spinal fluid leak?  Are you on long-term aspirin therapy?   No   Do you have cancer, leukemia, HIV/AIDS, or any other immune system problem?   No   Do you have a parent, " brother, or sister with an immune system problem?   No   In the past 3 months, have you taken medications that affect  your immune system, such as prednisone, other steroids, or anticancer drugs; drugs for the treatment of rheumatoid arthritis, Crohn s disease, or psoriasis; or have you had radiation treatments?   No   Have you had a seizure, or a brain or other nervous system problem?   No   During the past year, have you received a transfusion of blood or blood    products, or been given immune (gamma) globulin or antiviral drug?   No   For women: Are you pregnant or is there a chance you could become       pregnant during the next month?   No   Have you received any vaccinations in the past 4 weeks?   No     Immunization questionnaire answers were all negative.      Patient instructed to remain in clinic for 15 minutes afterwards, and to report any adverse reactions.     Screening performed by Carlos Daniels MA on 1/11/2024 at 3:56 PM.

## 2024-01-11 NOTE — PROGRESS NOTES
Red flag symptoms: painless loss of vision, decrease to vision, peripheral or central field of vision defect, eye pain, sudden flashing lights or floaters, or any or conjunctival laceration.

## 2024-01-11 NOTE — TELEPHONE ENCOUNTER
Called and spoke to Caroline     Made an appointment for 2/16 @ 2pm in acute for a ED follow up for eye pain     Flakita Thrasher Communication Facilitator on 1/11/2024 at 5:17 PM

## 2024-01-16 ENCOUNTER — ANCILLARY PROCEDURE (OUTPATIENT)
Dept: GENERAL RADIOLOGY | Facility: CLINIC | Age: 29
End: 2024-01-16
Attending: STUDENT IN AN ORGANIZED HEALTH CARE EDUCATION/TRAINING PROGRAM
Payer: COMMERCIAL

## 2024-01-16 ENCOUNTER — LAB (OUTPATIENT)
Dept: LAB | Facility: CLINIC | Age: 29
End: 2024-01-16
Payer: COMMERCIAL

## 2024-01-16 ENCOUNTER — OFFICE VISIT (OUTPATIENT)
Dept: OPHTHALMOLOGY | Facility: CLINIC | Age: 29
End: 2024-01-16
Attending: OPHTHALMOLOGY
Payer: COMMERCIAL

## 2024-01-16 DIAGNOSIS — H15.012 ANTERIOR SCLERITIS OF LEFT EYE: ICD-10-CM

## 2024-01-16 DIAGNOSIS — H15.012 ANTERIOR SCLERITIS OF EYE, LEFT: Primary | ICD-10-CM

## 2024-01-16 DIAGNOSIS — H57.13 PAIN OF BOTH EYES: ICD-10-CM

## 2024-01-16 DIAGNOSIS — H15.012 ANTERIOR SCLERITIS OF LEFT EYE: Primary | ICD-10-CM

## 2024-01-16 LAB
ACANTHOCYTES BLD QL SMEAR: ABNORMAL
ALBUMIN SERPL BCG-MCNC: 4.3 G/DL (ref 3.5–5.2)
ALBUMIN UR-MCNC: NEGATIVE MG/DL
ALP SERPL-CCNC: 67 U/L (ref 40–150)
ALT SERPL W P-5'-P-CCNC: 18 U/L (ref 0–50)
ANION GAP SERPL CALCULATED.3IONS-SCNC: 9 MMOL/L (ref 7–15)
APPEARANCE UR: CLEAR
AST SERPL W P-5'-P-CCNC: 17 U/L (ref 0–45)
AUER BODIES BLD QL SMEAR: ABNORMAL
BASO STIPL BLD QL SMEAR: ABNORMAL
BASOPHILS # BLD AUTO: 0.1 10E3/UL (ref 0–0.2)
BASOPHILS NFR BLD AUTO: 1 %
BILIRUB SERPL-MCNC: 0.4 MG/DL
BILIRUB UR QL STRIP: NEGATIVE
BITE CELLS BLD QL SMEAR: ABNORMAL
BLISTER CELLS BLD QL SMEAR: ABNORMAL
BUN SERPL-MCNC: 12.9 MG/DL (ref 6–20)
BURR CELLS BLD QL SMEAR: ABNORMAL
CALCIUM SERPL-MCNC: 9.2 MG/DL (ref 8.6–10)
CHLORIDE SERPL-SCNC: 105 MMOL/L (ref 98–107)
COLOR UR AUTO: ABNORMAL
CREAT SERPL-MCNC: 0.61 MG/DL (ref 0.51–0.95)
CRP SERPL-MCNC: <3 MG/L
DACRYOCYTES BLD QL SMEAR: ABNORMAL
DEPRECATED HCO3 PLAS-SCNC: 24 MMOL/L (ref 22–29)
EGFRCR SERPLBLD CKD-EPI 2021: >90 ML/MIN/1.73M2
ELLIPTOCYTES BLD QL SMEAR: ABNORMAL
EOSINOPHIL # BLD AUTO: 0.2 10E3/UL (ref 0–0.7)
EOSINOPHIL NFR BLD AUTO: 2 %
ERYTHROCYTE [DISTWIDTH] IN BLOOD BY AUTOMATED COUNT: 17.3 % (ref 10–15)
ERYTHROCYTE [SEDIMENTATION RATE] IN BLOOD BY WESTERGREN METHOD: 47 MM/HR (ref 0–20)
FRAGMENTS BLD QL SMEAR: SLIGHT
GLUCOSE SERPL-MCNC: 112 MG/DL (ref 70–99)
GLUCOSE UR STRIP-MCNC: NEGATIVE MG/DL
HCT VFR BLD AUTO: 34.3 % (ref 35–47)
HGB BLD-MCNC: 10.5 G/DL (ref 11.7–15.7)
HGB C CRYSTALS: ABNORMAL
HGB UR QL STRIP: ABNORMAL
HOWELL-JOLLY BOD BLD QL SMEAR: ABNORMAL
IMM GRANULOCYTES # BLD: 0.1 10E3/UL
IMM GRANULOCYTES NFR BLD: 1 %
KETONES UR STRIP-MCNC: NEGATIVE MG/DL
LEUKOCYTE ESTERASE UR QL STRIP: NEGATIVE
LYMPHOCYTES # BLD AUTO: 2.5 10E3/UL (ref 0.8–5.3)
LYMPHOCYTES NFR BLD AUTO: 31 %
MCH RBC QN AUTO: 19.6 PG (ref 26.5–33)
MCHC RBC AUTO-ENTMCNC: 30.6 G/DL (ref 31.5–36.5)
MCV RBC AUTO: 64 FL (ref 78–100)
MONOCYTES # BLD AUTO: 0.6 10E3/UL (ref 0–1.3)
MONOCYTES NFR BLD AUTO: 7 %
NEUTROPHILS # BLD AUTO: 4.8 10E3/UL (ref 1.6–8.3)
NEUTROPHILS NFR BLD AUTO: 58 %
NEUTS HYPERSEG BLD QL SMEAR: ABNORMAL
NITRATE UR QL: NEGATIVE
NRBC # BLD AUTO: 0 10E3/UL
NRBC BLD AUTO-RTO: 0 /100
PH UR STRIP: 6.5 [PH] (ref 5–7)
PLAT MORPH BLD: ABNORMAL
PLATELET # BLD AUTO: 320 10E3/UL (ref 150–450)
POLYCHROMASIA BLD QL SMEAR: ABNORMAL
POTASSIUM SERPL-SCNC: 4 MMOL/L (ref 3.4–5.3)
PROT SERPL-MCNC: 8.5 G/DL (ref 6.4–8.3)
RBC # BLD AUTO: 5.36 10E6/UL (ref 3.8–5.2)
RBC AGGLUT BLD QL: ABNORMAL
RBC MORPH BLD: ABNORMAL
RBC URINE: 3 /HPF
ROULEAUX BLD QL SMEAR: ABNORMAL
SICKLE CELLS BLD QL SMEAR: ABNORMAL
SMUDGE CELLS BLD QL SMEAR: ABNORMAL
SODIUM SERPL-SCNC: 138 MMOL/L (ref 135–145)
SP GR UR STRIP: 1.01 (ref 1–1.03)
SPHEROCYTES BLD QL SMEAR: ABNORMAL
SQUAMOUS EPITHELIAL: 4 /HPF
STOMATOCYTES BLD QL SMEAR: ABNORMAL
TARGETS BLD QL SMEAR: ABNORMAL
TOXIC GRANULES BLD QL SMEAR: ABNORMAL
URATE SERPL-MCNC: 6.1 MG/DL (ref 2.4–5.7)
UROBILINOGEN UR STRIP-MCNC: NORMAL MG/DL
VARIANT LYMPHS BLD QL SMEAR: ABNORMAL
WBC # BLD AUTO: 8.1 10E3/UL (ref 4–11)
WBC URINE: <1 /HPF

## 2024-01-16 PROCEDURE — 86200 CCP ANTIBODY: CPT | Performed by: STUDENT IN AN ORGANIZED HEALTH CARE EDUCATION/TRAINING PROGRAM

## 2024-01-16 PROCEDURE — 71045 X-RAY EXAM CHEST 1 VIEW: CPT | Mod: GC | Performed by: RADIOLOGY

## 2024-01-16 PROCEDURE — 86038 ANTINUCLEAR ANTIBODIES: CPT | Performed by: STUDENT IN AN ORGANIZED HEALTH CARE EDUCATION/TRAINING PROGRAM

## 2024-01-16 PROCEDURE — 99204 OFFICE O/P NEW MOD 45 MIN: CPT | Mod: GC | Performed by: OPHTHALMOLOGY

## 2024-01-16 PROCEDURE — G0463 HOSPITAL OUTPT CLINIC VISIT: HCPCS | Performed by: STUDENT IN AN ORGANIZED HEALTH CARE EDUCATION/TRAINING PROGRAM

## 2024-01-16 PROCEDURE — 80053 COMPREHEN METABOLIC PANEL: CPT | Performed by: PATHOLOGY

## 2024-01-16 PROCEDURE — 36415 COLL VENOUS BLD VENIPUNCTURE: CPT | Performed by: PATHOLOGY

## 2024-01-16 PROCEDURE — 99000 SPECIMEN HANDLING OFFICE-LAB: CPT | Performed by: PATHOLOGY

## 2024-01-16 PROCEDURE — 86780 TREPONEMA PALLIDUM: CPT | Performed by: STUDENT IN AN ORGANIZED HEALTH CARE EDUCATION/TRAINING PROGRAM

## 2024-01-16 PROCEDURE — 85025 COMPLETE CBC W/AUTO DIFF WBC: CPT | Performed by: PATHOLOGY

## 2024-01-16 PROCEDURE — 86431 RHEUMATOID FACTOR QUANT: CPT | Performed by: STUDENT IN AN ORGANIZED HEALTH CARE EDUCATION/TRAINING PROGRAM

## 2024-01-16 PROCEDURE — 85549 MURAMIDASE: CPT | Mod: 90 | Performed by: PATHOLOGY

## 2024-01-16 PROCEDURE — 85652 RBC SED RATE AUTOMATED: CPT | Performed by: PATHOLOGY

## 2024-01-16 PROCEDURE — 86037 ANCA TITER EACH ANTIBODY: CPT | Performed by: STUDENT IN AN ORGANIZED HEALTH CARE EDUCATION/TRAINING PROGRAM

## 2024-01-16 PROCEDURE — 82164 ANGIOTENSIN I ENZYME TEST: CPT | Mod: 90 | Performed by: PATHOLOGY

## 2024-01-16 PROCEDURE — 81001 URINALYSIS AUTO W/SCOPE: CPT | Performed by: PATHOLOGY

## 2024-01-16 PROCEDURE — 84550 ASSAY OF BLOOD/URIC ACID: CPT | Performed by: PATHOLOGY

## 2024-01-16 PROCEDURE — 86140 C-REACTIVE PROTEIN: CPT | Performed by: PATHOLOGY

## 2024-01-16 RX ORDER — IBUPROFEN 600 MG/1
600 TABLET, FILM COATED ORAL 3 TIMES DAILY
Qty: 90 TABLET | Refills: 1 | Status: SHIPPED | OUTPATIENT
Start: 2024-01-16

## 2024-01-16 ASSESSMENT — SLIT LAMP EXAM - LIDS
COMMENTS: NORMAL
COMMENTS: NORMAL

## 2024-01-16 ASSESSMENT — CONF VISUAL FIELD
OD_SUPERIOR_TEMPORAL_RESTRICTION: 0
OD_NORMAL: 1
OS_SUPERIOR_TEMPORAL_RESTRICTION: 0
OS_SUPERIOR_NASAL_RESTRICTION: 0
METHOD: COUNTING FINGERS
OD_SUPERIOR_NASAL_RESTRICTION: 0
OS_NORMAL: 1
OS_INFERIOR_NASAL_RESTRICTION: 0
OD_INFERIOR_NASAL_RESTRICTION: 0
OD_INFERIOR_TEMPORAL_RESTRICTION: 0
OS_INFERIOR_TEMPORAL_RESTRICTION: 0

## 2024-01-16 ASSESSMENT — TONOMETRY
OD_IOP_MMHG: 18
IOP_METHOD: ICARE
OS_IOP_MMHG: 16

## 2024-01-16 ASSESSMENT — EXTERNAL EXAM - RIGHT EYE: OD_EXAM: NORMAL

## 2024-01-16 ASSESSMENT — EXTERNAL EXAM - LEFT EYE: OS_EXAM: NORMAL

## 2024-01-16 ASSESSMENT — VISUAL ACUITY
OS_SC: 20/30
OS_SC+: -1
OD_PH_SC: 20/25
OD_SC: 20/30

## 2024-01-16 ASSESSMENT — CUP TO DISC RATIO
OD_RATIO: 0.3
OS_RATIO: 0.3

## 2024-01-16 NOTE — NURSING NOTE
"Chief Complaints and History of Present Illnesses   Patient presents with    Red Eye Both Eyes     Eye pain and redness       Chief Complaint(s) and History of Present Illness(es)       Red Eye Both Eyes              Comments: Eye pain and redness                Comments    Pt states she has had red eyes ad eye pain X 3 weeks   Saw PC doctor and was given polytrim QID , does not seem to help   No tearing or mattering   Eye pain now is 0/10 on the pain scale now \" I took Ibuprophen this morning\"    Yoli Bosch COT 12:13 PM January 16, 2024                        "

## 2024-01-16 NOTE — PROGRESS NOTES
"HPI       Red Eye Both Eyes     Additional comments: Eye pain and redness               Comments    Pt states she has had red eyes ad eye pain X 3 weeks   Saw PC doctor and was given polytrim QID , does not seem to help   No tearing or mattering   Eye pain now is 0/10 on the pain scale now \" I took Ibuprophen this morning\"    Yoli Bosch COT 12:13 PM January 16, 2024               Last edited by Yoli Bosch on 1/16/2024 12:13 PM.          [facility]       HPI:   Caroline Rowe is a 28 year old female who presents for evaluation of red eyes in both eyes for 3 weeks. States that 3 weeks ago she had a migraine, since then has noticed red eyes in both eyes. Today she feels its mostly her left eye. Associated with a dull achy pain in both eyes initially, now just in left eye. Pain has been relieved with ibuprofen. No tearing or itching. No discharge or crusting. No changes in vision. No flashes or floaters.     No history of autoimmune conditions. No steroid use. No recent infections. No rashes. No cough, weight loss, fevers.     Past Ocular history:   - Glasses: No  - Contact lens wear: No  - Ocular medical history: No  - Ocular surgical history: No  - Current eye drops: Polytrim qid each eye     PMH:   Past Medical History:   Diagnosis Date    Anemia     Breast lump - right breast 02/05/2018    Breast mass, right 02/05/2018    Genital warts 10/27/2020    Urinary tract infection          FH:  No family history of glaucoma, AMD, or other ocular disease      Review of systems for the eyes was negative other than the pertinent positives/negatives listed in the HPI.          Assessment & Plan      Caroline Rowe is a 28 year old female with the following diagnoses:     # Scleritis, left eye   Patient presents with 3 weeks of left eye redness and tenderness to touch.   Exam shows violaceous injection of the temporal sclera in the left eye. No scleromalacia. No nodules.    - Redness did not martita with " phenylephrine.    - Photos in chart.  Fundus exam was unremarkable. No optic nerve edema, no choroidal folds, no macular edema, no exudative detachments.   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   - No known history of autoimmune or infectious disease, but will pursue workup for underlying cause.      Plan:   - Start ibuprofen 600 mg TID PO  - Start GI ppx - omeprazole 20 mg daily PO   - Labs ordered: ESR, CRP, RPR, ANCA, RF, CORTEZ, ACE, anti-CCP, urinalysis, uric acid, CMP, CBC, lysozyme, CXR       Follow up:  Return in about 2 weeks (around 1/30/2024) for VT.    Patient seen with Dr. Bosch.     Thank you for entrusting us with your care,    Kev Morrissey M.D.  PGY-3 Resident Physician  Department of Ophthalmology  01/16/24 12:27 PM    Attending Physician Attestation:  Complete documentation of historical and exam elements from today's encounter can be found in the full encounter summary report (not reduplicated in this progress note).  I personally obtained the chief complaint(s) and history of present illness.  I confirmed and edited as necessary the review of systems, past medical/surgical history, family history, social history, and examination findings as documented by others; and I examined the patient myself.  I personally reviewed the relevant tests, images, and reports as documented above.  I formulated and edited as necessary the assessment and plan and discussed the findings and management plan with the patient and family. . - Marcelino Bosch MD

## 2024-01-17 LAB
ACE SERPL-CCNC: 26 U/L
ANA SER QL IF: NEGATIVE
ANCA AB PATTERN SER IF-IMP: NORMAL
C-ANCA TITR SER IF: NORMAL {TITER}
CCP AB SER IA-ACNC: 3.7 U/ML
RHEUMATOID FACT SERPL-ACNC: <10 IU/ML
T PALLIDUM AB SER QL: NONREACTIVE

## 2024-01-18 LAB — LYSOZYME SERPL-MCNC: 1.19 UG/ML

## 2024-06-26 NOTE — PATIENT INSTRUCTIONS
[facility]  [phone]    IUDs  Paragard (10 years, no hormones/copper)  Mirena (7 years, with hormones)   1.53

## 2024-07-06 ENCOUNTER — HEALTH MAINTENANCE LETTER (OUTPATIENT)
Age: 29
End: 2024-07-06

## 2025-07-13 ENCOUNTER — HEALTH MAINTENANCE LETTER (OUTPATIENT)
Age: 30
End: 2025-07-13